# Patient Record
Sex: FEMALE
[De-identification: names, ages, dates, MRNs, and addresses within clinical notes are randomized per-mention and may not be internally consistent; named-entity substitution may affect disease eponyms.]

---

## 2023-06-19 PROBLEM — Z00.00 ENCOUNTER FOR PREVENTIVE HEALTH EXAMINATION: Status: ACTIVE | Noted: 2023-06-19

## 2023-06-20 ENCOUNTER — APPOINTMENT (OUTPATIENT)
Dept: ORTHOPEDIC SURGERY | Facility: CLINIC | Age: 63
End: 2023-06-20
Payer: COMMERCIAL

## 2023-06-20 VITALS — WEIGHT: 133 LBS | HEIGHT: 59 IN | BODY MASS INDEX: 26.81 KG/M2

## 2023-06-20 DIAGNOSIS — M16.11 UNILATERAL PRIMARY OSTEOARTHRITIS, RIGHT HIP: ICD-10-CM

## 2023-06-20 DIAGNOSIS — M54.50 LOW BACK PAIN, UNSPECIFIED: ICD-10-CM

## 2023-06-20 DIAGNOSIS — E78.00 PURE HYPERCHOLESTEROLEMIA, UNSPECIFIED: ICD-10-CM

## 2023-06-20 PROCEDURE — 72100 X-RAY EXAM L-S SPINE 2/3 VWS: CPT

## 2023-06-20 PROCEDURE — 73564 X-RAY EXAM KNEE 4 OR MORE: CPT | Mod: 50

## 2023-06-20 PROCEDURE — 99205 OFFICE O/P NEW HI 60 MIN: CPT

## 2023-06-20 PROCEDURE — 72170 X-RAY EXAM OF PELVIS: CPT

## 2023-06-20 RX ORDER — HYALURONATE SODIUM 30 MG/2 ML
30 SYRINGE (ML) INTRAARTICULAR
Qty: 4 | Refills: 0 | Status: ACTIVE | COMMUNITY
Start: 2023-06-20 | End: 1900-01-01

## 2023-06-20 NOTE — IMAGING
[de-identified] : Constitutional: well developed and well nourished, able to communicate\par Cardiovascular: Peripheral vascular exam is grossly normal\par Neurologic: Alert and oriented, no acute distress.\par Skin: normal skin with no ulcers, rashes, or lesions\par Pulmonary: No respiratory distress, breathing comfortably on room air\par Lymphatics: No obvious lymphadenopathy or lymphedema in areas examined\par \par Left KNEE EXAM\par Alignment: neutral\par Effusion: None\par Atrophy: None                                                 \par Stable to Varus/valgus stress\par Posterior Drawer Test: negative\par Anterior Drawer Test: Negative\par Knee Extension/Flexion: 0 / 120\par \par Medial/lateral compartments\par Medial joint line: POS Tenderness\par Lateral joint line: POS Tenderness\par Henrique test: negative\par \par Patellofemoral joint\par Medial patellar facet: no tenderness\par Patellar grind: Negative\par \par Tendons:\par Pes Anserine: No tenderness\par Gerdy’s Tubercle/ IT Band: No tenderness\par Quadriceps Tendon: No Tenderness\par patellar tendon: no Tenderness\par Tibial tubercle: not tenderness\par Calf: no Tenderness\par \par Neurovascular exam\par Muscle strength: 5/5\par Sensation to light touch: intact\par Distal pulses: 2+\par \par bilalateral hips \par hips WNL without impingement\par \par IMAGING:\par 06/20/2023 Xrays of the bilateral Knee were taken demonstrating tricompartmental arthritis with joint space collapse, osteophytes, and sclerosis. Lumbar with L5/S1 DDD and right hip tonnise grade 2 OA

## 2023-06-20 NOTE — HISTORY OF PRESENT ILLNESS
[10] : 10 [Dull/Aching] : dull/aching [Sharp] : sharp [Constant] : constant [Walking] : walking [Stairs] : stairs [de-identified] : 06/20/2023 Ms. RICHARDSON MAN is a 62 year female that comes in today with a chief complaint of Neil knee/ right knee Pt had a right knee a tear meniscus was done in 2018  Left knee > right knee. pt is concerned about numbing on her left foot pt sates she also have pain on her right hip radiating down her lower back \par \par Last injection Left knee March 2023. [] : no [FreeTextEntry1] : Neil. knees [FreeTextEntry6] : pinching, numbness  [FreeTextEntry7] : left knee to thigh and down to her left foot  [de-identified] : 2018

## 2023-06-20 NOTE — ASSESSMENT
[FreeTextEntry1] : 62 year F with bilateral knee OA with R>L radiographically and left side more symptomatic\par left knee orthovisc submission for approval\par fu after approvaed\par

## 2023-06-21 RX ORDER — SODIUM HYALURONATE INTRA-ARTICULAR SOLN PREF SYR 25 MG/2.5ML 25/2.5 MG/ML
25 SOLUTION PREFILLED SYRINGE INTRAARTICULAR
Qty: 10 | Refills: 0 | Status: ACTIVE | COMMUNITY
Start: 2023-06-21

## 2023-07-18 ENCOUNTER — APPOINTMENT (OUTPATIENT)
Dept: ORTHOPEDIC SURGERY | Facility: CLINIC | Age: 63
End: 2023-07-18
Payer: COMMERCIAL

## 2023-07-18 VITALS — HEIGHT: 59 IN | WEIGHT: 133 LBS | BODY MASS INDEX: 26.81 KG/M2

## 2023-07-18 PROCEDURE — 20610 DRAIN/INJ JOINT/BURSA W/O US: CPT | Mod: LT

## 2023-07-18 PROCEDURE — 99213 OFFICE O/P EST LOW 20 MIN: CPT | Mod: 25

## 2023-07-18 NOTE — HISTORY OF PRESENT ILLNESS
[10] : 10 [Dull/Aching] : dull/aching [Sharp] : sharp [Constant] : constant [Walking] : walking [Stairs] : stairs [1] : 1 [Supartz] : Supartz [de-identified] : 06/20/2023 Ms. RICHARDSON MAN is a 62 year female that comes in today with a chief complaint of Neil knee/ right knee Pt had a right knee a tear meniscus was done in 2018  Left knee > right knee. pt is concerned about numbing on her left foot pt sates she also have pain on her right hip radiating down her lower back \par \par Last injection Left knee March 2023.\par \par 07/18/2023 follow up visit injection #1 left knee supartz [] : no [FreeTextEntry1] : Neil. knees [FreeTextEntry6] : pinching, numbness  [FreeTextEntry7] : left knee to thigh and down to her left foot  [de-identified] : 2018 [de-identified] : left knee

## 2023-07-18 NOTE — IMAGING
[de-identified] : Constitutional: well developed and well nourished, able to communicate\par Cardiovascular: Peripheral vascular exam is grossly normal\par Neurologic: Alert and oriented, no acute distress.\par Skin: normal skin with no ulcers, rashes, or lesions\par Pulmonary: No respiratory distress, breathing comfortably on room air\par Lymphatics: No obvious lymphadenopathy or lymphedema in areas examined\par \par Left KNEE EXAM\par Alignment: neutral\par Effusion: None\par Atrophy: None                                                 \par Stable to Varus/valgus stress\par Posterior Drawer Test: negative\par Anterior Drawer Test: Negative\par Knee Extension/Flexion: 0 / 120\par \par Medial/lateral compartments\par Medial joint line: POS Tenderness\par Lateral joint line: POS Tenderness\par Henrique test: negative\par \par Patellofemoral joint\par Medial patellar facet: no tenderness\par Patellar grind: Negative\par \par Tendons:\par Pes Anserine: No tenderness\par Gerdy’s Tubercle/ IT Band: No tenderness\par Quadriceps Tendon: No Tenderness\par patellar tendon: no Tenderness\par Tibial tubercle: not tenderness\par Calf: no Tenderness\par \par Neurovascular exam\par Muscle strength: 5/5\par Sensation to light touch: intact\par Distal pulses: 2+\par \par bilalateral hips \par hips WNL without impingement\par \par IMAGING:\par 06/20/2023 Xrays of the bilateral Knee were taken demonstrating tricompartmental arthritis with joint space collapse, osteophytes, and sclerosis. Lumbar with L5/S1 DDD and right hip tonnise grade 2 OA

## 2023-07-18 NOTE — ASSESSMENT
[FreeTextEntry1] : 62 year F with bilateral knee OA with R>L radiographically and left side more symptomatic\par left knee orthovisc submission for approval\par fu after approved\par \par 7/18/23: L knee OA\par Supartz 1 of 5, follow up in one week for inj #2\par

## 2023-07-18 NOTE — PROCEDURE
[FreeTextEntry3] : Large joint Viscosupplementation Injection: L Knee\par \par Patient indicated for injection after trial of rest, OTC medications including aspirin, Ibuprofen, Aleve etc or prescription NSAIDS, and/or exercises at home and/ or physical therapy without satisfactory response.  Patient has symptoms including pain, swelling, and/or decreased mobility in the joint. The risks, benefits, and alternatives to injection were explained in full to the patient, including but not limited to infection, sepsis, bleeding, scarring, skin discoloration, temporary increase in pain, syncopal episode, failure to resolve symptoms, allergic reaction, symptom recurrence, and elevation of blood sugar in diabetics. Patient understood the risks. All questions were answered. After discussion of options, patient requested an injection. \par \par Oral informed consent was obtained and sterile technique was utilized for the procedure including the preparation of the solutions used for the injection followed by alcohol prep to the injection site. Anesthesia was given with ethyl chloride sprayed topically. The injection was delivered. Patient tolerated the procedure well. \par \par Post Procedure Instructions: Patient was advised to call if redness, pain, or fever occur and apply ice for 15 min on and 15 min off later today\par \par Medications delivered:Supartz # 1\par

## 2023-07-25 ENCOUNTER — APPOINTMENT (OUTPATIENT)
Dept: ORTHOPEDIC SURGERY | Facility: CLINIC | Age: 63
End: 2023-07-25
Payer: COMMERCIAL

## 2023-07-25 VITALS — HEIGHT: 59 IN | WEIGHT: 133 LBS | BODY MASS INDEX: 26.81 KG/M2

## 2023-07-25 PROCEDURE — 99213 OFFICE O/P EST LOW 20 MIN: CPT | Mod: 25

## 2023-07-25 PROCEDURE — 20610 DRAIN/INJ JOINT/BURSA W/O US: CPT | Mod: LT

## 2023-07-25 RX ORDER — MELOXICAM 15 MG/1
15 TABLET ORAL
Qty: 30 | Refills: 0 | Status: ACTIVE | COMMUNITY
Start: 2023-06-20 | End: 1900-01-01

## 2023-07-25 NOTE — PROCEDURE
[FreeTextEntry3] : Large joint Viscosupplementation Injection: L Knee\par \par Patient indicated for injection after trial of rest, OTC medications including aspirin, Ibuprofen, Aleve etc or prescription NSAIDS, and/or exercises at home and/ or physical therapy without satisfactory response.  Patient has symptoms including pain, swelling, and/or decreased mobility in the joint. The risks, benefits, and alternatives to injection were explained in full to the patient, including but not limited to infection, sepsis, bleeding, scarring, skin discoloration, temporary increase in pain, syncopal episode, failure to resolve symptoms, allergic reaction, symptom recurrence, and elevation of blood sugar in diabetics. Patient understood the risks. All questions were answered. After discussion of options, patient requested an injection. \par \par Oral informed consent was obtained and sterile technique was utilized for the procedure including the preparation of the solutions used for the injection followed by alcohol prep to the injection site. Anesthesia was given with ethyl chloride sprayed topically. The injection was delivered. Patient tolerated the procedure well. \par \par Post Procedure Instructions: Patient was advised to call if redness, pain, or fever occur and apply ice for 15 min on and 15 min off later today\par \par Medications delivered:Supartz # 2\par

## 2023-07-25 NOTE — HISTORY OF PRESENT ILLNESS
[10] : 10 [Dull/Aching] : dull/aching [Sharp] : sharp [Constant] : constant [Walking] : walking [Stairs] : stairs [Supartz] : Supartz [2] : 2 [de-identified] : 06/20/2023 Ms. RICHARDSON MAN is a 62 year female that comes in today with a chief complaint of Neil knee/ right knee Pt had a right knee a tear meniscus was done in 2018  Left knee > right knee. pt is concerned about numbing on her left foot pt sates she also have pain on her right hip radiating down her lower back \par \par Last injection Left knee March 2023.\par \par 07/18/2023 follow up visit injection #1 left knee supartz\par \par 07/25/2023 follow up injection left knee#2 [] : no [FreeTextEntry1] : Neil. knees [FreeTextEntry6] : pinching, numbness  [FreeTextEntry7] : left knee to thigh and down to her left foot  [de-identified] : 2018 [de-identified] : left knee

## 2023-07-25 NOTE — IMAGING
[de-identified] : Constitutional: well developed and well nourished, able to communicate\par Cardiovascular: Peripheral vascular exam is grossly normal\par Neurologic: Alert and oriented, no acute distress.\par Skin: normal skin with no ulcers, rashes, or lesions\par Pulmonary: No respiratory distress, breathing comfortably on room air\par Lymphatics: No obvious lymphadenopathy or lymphedema in areas examined\par \par Left KNEE EXAM\par Alignment: neutral\par Effusion: None\par Atrophy: None                                                 \par Stable to Varus/valgus stress\par Posterior Drawer Test: negative\par Anterior Drawer Test: Negative\par Knee Extension/Flexion: 0 / 120\par \par Medial/lateral compartments\par Medial joint line: POS Tenderness\par Lateral joint line: POS Tenderness\par Henrique test: negative\par \par Patellofemoral joint\par Medial patellar facet: no tenderness\par Patellar grind: Negative\par \par Tendons:\par Pes Anserine: No tenderness\par Gerdy’s Tubercle/ IT Band: No tenderness\par Quadriceps Tendon: No Tenderness\par patellar tendon: no Tenderness\par Tibial tubercle: not tenderness\par Calf: no Tenderness\par \par Neurovascular exam\par Muscle strength: 5/5\par Sensation to light touch: intact\par Distal pulses: 2+\par \par bilalateral hips \par hips WNL without impingement\par \par IMAGING:\par 06/20/2023 Xrays of the bilateral Knee were taken demonstrating tricompartmental arthritis with joint space collapse, osteophytes, and sclerosis. Lumbar with L5/S1 DDD and right hip tonnise grade 2 OA

## 2023-07-25 NOTE — ASSESSMENT
[FreeTextEntry1] : 62 year F with bilateral knee OA with R>L radiographically and left side more symptomatic\par left knee orthovisc submission for approval\par fu after approved\par \par 7/18/23: L knee OA\par Supartz 1 of 5, follow up in one week for inj #2\par \par 07/25/2023 \par Supartz 2 of 5, follow up in one week for inj #3\par

## 2023-08-01 ENCOUNTER — APPOINTMENT (OUTPATIENT)
Dept: ORTHOPEDIC SURGERY | Facility: CLINIC | Age: 63
End: 2023-08-01
Payer: COMMERCIAL

## 2023-08-01 VITALS — HEIGHT: 59 IN | WEIGHT: 133 LBS | BODY MASS INDEX: 26.81 KG/M2

## 2023-08-01 DIAGNOSIS — M17.11 UNILATERAL PRIMARY OSTEOARTHRITIS, RIGHT KNEE: ICD-10-CM

## 2023-08-01 DIAGNOSIS — M25.561 PAIN IN RIGHT KNEE: ICD-10-CM

## 2023-08-01 PROCEDURE — 20610 DRAIN/INJ JOINT/BURSA W/O US: CPT | Mod: LT

## 2023-08-01 PROCEDURE — 99213 OFFICE O/P EST LOW 20 MIN: CPT | Mod: 25

## 2023-08-01 NOTE — ASSESSMENT
[FreeTextEntry1] : 62 year F with bilateral knee OA with R>L radiographically and left side more symptomatic left knee orthovisc submission for approval fu after approved  7/18/23: L knee OA Supartz 1 of 5, follow up in one week for inj #2  07/25/2023  Supartz 2 of 5, follow up in one week for inj #3  8/1/23: Supartz 3 of 5, follow up in one week for inj #4

## 2023-08-01 NOTE — HISTORY OF PRESENT ILLNESS
[10] : 10 [Dull/Aching] : dull/aching [Sharp] : sharp [Constant] : constant [Walking] : walking [Stairs] : stairs [3] : 3 [Supartz] : Supartz [de-identified] : 06/20/2023 Ms. RICHARDSON MAN is a 62 year female that comes in today with a chief complaint of Neil knee/ right knee Pt had a right knee a tear meniscus was done in 2018  Left knee > right knee. pt is concerned about numbing on her left foot pt sates she also have pain on her right hip radiating down her lower back   Last injection Left knee March 2023.  07/18/2023 follow up visit injection #1 left knee supartz  07/25/2023 follow up injection left knee#2  08/01/2023 follow up visit injection left knee  #3 [] : no [FreeTextEntry1] : Neil. knees [FreeTextEntry6] : pinching, numbness  [FreeTextEntry7] : left knee to thigh and down to her left foot  [de-identified] : 2018 [de-identified] : left knee

## 2023-08-01 NOTE — PROCEDURE
[FreeTextEntry3] : Large joint Viscosupplementation Injection: L Knee  Patient indicated for injection after trial of rest, OTC medications including aspirin, Ibuprofen, Aleve etc or prescription NSAIDS, and/or exercises at home and/ or physical therapy without satisfactory response.  Patient has symptoms including pain, swelling, and/or decreased mobility in the joint. The risks, benefits, and alternatives to injection were explained in full to the patient, including but not limited to infection, sepsis, bleeding, scarring, skin discoloration, temporary increase in pain, syncopal episode, failure to resolve symptoms, allergic reaction, symptom recurrence, and elevation of blood sugar in diabetics. Patient understood the risks. All questions were answered. After discussion of options, patient requested an injection.   Oral informed consent was obtained and sterile technique was utilized for the procedure including the preparation of the solutions used for the injection followed by alcohol prep to the injection site. Anesthesia was given with ethyl chloride sprayed topically. The injection was delivered. Patient tolerated the procedure well.   Post Procedure Instructions: Patient was advised to call if redness, pain, or fever occur and apply ice for 15 min on and 15 min off later today  Medications delivered:Supartz # 3

## 2023-08-01 NOTE — IMAGING
[de-identified] : Constitutional: well developed and well nourished, able to communicate\par  Cardiovascular: Peripheral vascular exam is grossly normal\par  Neurologic: Alert and oriented, no acute distress.\par  Skin: normal skin with no ulcers, rashes, or lesions\par  Pulmonary: No respiratory distress, breathing comfortably on room air\par  Lymphatics: No obvious lymphadenopathy or lymphedema in areas examined\par  \par  Left KNEE EXAM\par  Alignment: neutral\par  Effusion: None\par  Atrophy: None                                                 \par  Stable to Varus/valgus stress\par  Posterior Drawer Test: negative\par  Anterior Drawer Test: Negative\par  Knee Extension/Flexion: 0 / 120\par  \par  Medial/lateral compartments\par  Medial joint line: POS Tenderness\par  Lateral joint line: POS Tenderness\par  Henrique test: negative\par  \par  Patellofemoral joint\par  Medial patellar facet: no tenderness\par  Patellar grind: Negative\par  \par  Tendons:\par  Pes Anserine: No tenderness\par  Gerdy's Tubercle/ IT Band: No tenderness\par  Quadriceps Tendon: No Tenderness\par  patellar tendon: no Tenderness\par  Tibial tubercle: not tenderness\par  Calf: no Tenderness\par  \par  Neurovascular exam\par  Muscle strength: 5/5\par  Sensation to light touch: intact\par  Distal pulses: 2+\par  \par  bilalateral hips \par  hips WNL without impingement\par  \par  IMAGING:\par  06/20/2023 Xrays of the bilateral Knee were taken demonstrating tricompartmental arthritis with joint space collapse, osteophytes, and sclerosis. Lumbar with L5/S1 DDD and right hip tonnise grade 2 OA

## 2023-08-08 ENCOUNTER — APPOINTMENT (OUTPATIENT)
Dept: ORTHOPEDIC SURGERY | Facility: CLINIC | Age: 63
End: 2023-08-08
Payer: COMMERCIAL

## 2023-08-08 VITALS — HEIGHT: 59 IN | BODY MASS INDEX: 26.81 KG/M2 | WEIGHT: 133 LBS

## 2023-08-08 DIAGNOSIS — M25.562 PAIN IN LEFT KNEE: ICD-10-CM

## 2023-08-08 PROCEDURE — 20610 DRAIN/INJ JOINT/BURSA W/O US: CPT | Mod: LT

## 2023-08-08 PROCEDURE — 99213 OFFICE O/P EST LOW 20 MIN: CPT | Mod: 25

## 2023-08-08 NOTE — ASSESSMENT
[FreeTextEntry1] : 62 year F with bilateral knee OA with R>L radiographically and left side more symptomatic left knee orthovisc submission for approval fu after approved  7/18/23: L knee OA Supartz 1 of 5, follow up in one week for inj #2  07/25/2023  Supartz 2 of 5, follow up in one week for inj #3  8/1/23: Supartz 3 of 5, follow up in one week for inj #4  8/8/23: Supartz 4 of 5, follow up in one week for inj #5

## 2023-08-08 NOTE — IMAGING
[de-identified] : Constitutional: well developed and well nourished, able to communicate\par  Cardiovascular: Peripheral vascular exam is grossly normal\par  Neurologic: Alert and oriented, no acute distress.\par  Skin: normal skin with no ulcers, rashes, or lesions\par  Pulmonary: No respiratory distress, breathing comfortably on room air\par  Lymphatics: No obvious lymphadenopathy or lymphedema in areas examined\par  \par  Left KNEE EXAM\par  Alignment: neutral\par  Effusion: None\par  Atrophy: None                                                 \par  Stable to Varus/valgus stress\par  Posterior Drawer Test: negative\par  Anterior Drawer Test: Negative\par  Knee Extension/Flexion: 0 / 120\par  \par  Medial/lateral compartments\par  Medial joint line: POS Tenderness\par  Lateral joint line: POS Tenderness\par  Henrique test: negative\par  \par  Patellofemoral joint\par  Medial patellar facet: no tenderness\par  Patellar grind: Negative\par  \par  Tendons:\par  Pes Anserine: No tenderness\par  Gerdy's Tubercle/ IT Band: No tenderness\par  Quadriceps Tendon: No Tenderness\par  patellar tendon: no Tenderness\par  Tibial tubercle: not tenderness\par  Calf: no Tenderness\par  \par  Neurovascular exam\par  Muscle strength: 5/5\par  Sensation to light touch: intact\par  Distal pulses: 2+\par  \par  bilalateral hips \par  hips WNL without impingement\par  \par  IMAGING:\par  06/20/2023 Xrays of the bilateral Knee were taken demonstrating tricompartmental arthritis with joint space collapse, osteophytes, and sclerosis. Lumbar with L5/S1 DDD and right hip tonnise grade 2 OA

## 2023-08-08 NOTE — PROCEDURE
[FreeTextEntry3] : Large joint Viscosupplementation Injection: L Knee  Patient indicated for injection after trial of rest, OTC medications including aspirin, Ibuprofen, Aleve etc or prescription NSAIDS, and/or exercises at home and/ or physical therapy without satisfactory response.  Patient has symptoms including pain, swelling, and/or decreased mobility in the joint. The risks, benefits, and alternatives to injection were explained in full to the patient, including but not limited to infection, sepsis, bleeding, scarring, skin discoloration, temporary increase in pain, syncopal episode, failure to resolve symptoms, allergic reaction, symptom recurrence, and elevation of blood sugar in diabetics. Patient understood the risks. All questions were answered. After discussion of options, patient requested an injection.   Oral informed consent was obtained and sterile technique was utilized for the procedure including the preparation of the solutions used for the injection followed by alcohol prep to the injection site. Anesthesia was given with ethyl chloride sprayed topically. The injection was delivered. Patient tolerated the procedure well.   Post Procedure Instructions: Patient was advised to call if redness, pain, or fever occur and apply ice for 15 min on and 15 min off later today  Medications delivered:Supartz # 4

## 2023-08-08 NOTE — HISTORY OF PRESENT ILLNESS
[10] : 10 [Dull/Aching] : dull/aching [Sharp] : sharp [Constant] : constant [Walking] : walking [Stairs] : stairs [Supartz] : Supartz [3] : 3 [de-identified] : 06/20/2023 Ms. RICHARDSON MAN is a 62 year female that comes in today with a chief complaint of Neil knee/ right knee Pt had a right knee a tear meniscus was done in 2018  Left knee > right knee. pt is concerned about numbing on her left foot pt sates she also have pain on her right hip radiating down her lower back   Last injection Left knee March 2023.  07/18/2023 follow up visit injection #1 left knee supartz  07/25/2023 follow up injection left knee#2  08/01/2023 follow up visit injection left knee  #3  8/8/23 Follow up visit injection #4 [] : Post Surgical Visit: no [FreeTextEntry1] : Neil. knees [FreeTextEntry6] : pinching, numbness  [FreeTextEntry7] : left knee to thigh and down to her left foot  [de-identified] : 2018 [de-identified] : left knee

## 2023-08-15 ENCOUNTER — TRANSCRIPTION ENCOUNTER (OUTPATIENT)
Age: 63
End: 2023-08-15

## 2023-08-15 ENCOUNTER — APPOINTMENT (OUTPATIENT)
Dept: ORTHOPEDIC SURGERY | Facility: CLINIC | Age: 63
End: 2023-08-15
Payer: COMMERCIAL

## 2023-08-15 VITALS — WEIGHT: 133 LBS | BODY MASS INDEX: 26.81 KG/M2 | HEIGHT: 59 IN

## 2023-08-15 DIAGNOSIS — M17.12 UNILATERAL PRIMARY OSTEOARTHRITIS, LEFT KNEE: ICD-10-CM

## 2023-08-15 PROCEDURE — 99213 OFFICE O/P EST LOW 20 MIN: CPT | Mod: 25

## 2023-08-15 PROCEDURE — 20610 DRAIN/INJ JOINT/BURSA W/O US: CPT | Mod: LT

## 2023-08-15 NOTE — HISTORY OF PRESENT ILLNESS
[10] : 10 [Dull/Aching] : dull/aching [Sharp] : sharp [Constant] : constant [Walking] : walking [Stairs] : stairs [3] : 3 [Supartz] : Supartz [de-identified] : 06/20/2023 Ms. RICHARDSON MAN is a 62 year female that comes in today with a chief complaint of Neil knee/ right knee Pt had a right knee a tear meniscus was done in 2018  Left knee > right knee. pt is concerned about numbing on her left foot pt sates she also have pain on her right hip radiating down her lower back   Last injection Left knee March 2023.  07/18/2023 follow up visit injection #1 left knee supartz  07/25/2023 follow up injection left knee#2  08/01/2023 follow up visit injection left knee  #3  8/8/23 Follow up visit injection #4 08/15/2023 follow up visit.  [] : Post Surgical Visit: no [FreeTextEntry1] : Neil. knees [FreeTextEntry6] : pinching, numbness  [FreeTextEntry7] : left knee to thigh and down to her left foot  [de-identified] : 2018 [de-identified] : left knee

## 2023-08-15 NOTE — IMAGING
[de-identified] : Constitutional: well developed and well nourished, able to communicate Cardiovascular: Peripheral vascular exam is grossly normal Neurologic: Alert and oriented, no acute distress. Skin: normal skin with no ulcers, rashes, or lesions Pulmonary: No respiratory distress, breathing comfortably on room air Lymphatics: No obvious lymphadenopathy or lymphedema in areas examined  Left KNEE EXAM Alignment: neutral Effusion: None Atrophy: None                                                  Stable to Varus/valgus stress Posterior Drawer Test: negative Anterior Drawer Test: Negative Knee Extension/Flexion: 0 / 120  Medial/lateral compartments Medial joint line: POS Tenderness Lateral joint line: POS Tenderness Henrique test: negative  Patellofemoral joint Medial patellar facet: no tenderness Patellar grind: Negative  Tendons: Pes Anserine: No tenderness Gerdy's Tubercle/ IT Band: No tenderness Quadriceps Tendon: No Tenderness patellar tendon: no Tenderness Tibial tubercle: not tenderness Calf: no Tenderness  Neurovascular exam Muscle strength: 5/5 Sensation to light touch: intact Distal pulses: 2+  bilalateral hips  hips WNL without impingement  [id number] Xrays of the bilateral Knee were taken demonstrating tricompartmental arthritis with joint space collapse, osteophytes, and sclerosis. Lumbar with L5/S1 DDD and right hip tonnise grade 2 OA

## 2023-08-15 NOTE — PROCEDURE
[FreeTextEntry3] : Large joint Viscosupplementation Injection: L Knee  Patient indicated for injection after trial of rest, OTC medications including aspirin, Ibuprofen, Aleve etc or prescription NSAIDS, and/or exercises at home and/ or physical therapy without satisfactory response.  Patient has symptoms including pain, swelling, and/or decreased mobility in the joint. The risks, benefits, and alternatives to injection were explained in full to the patient, including but not limited to infection, sepsis, bleeding, scarring, skin discoloration, temporary increase in pain, syncopal episode, failure to resolve symptoms, allergic reaction, symptom recurrence, and elevation of blood sugar in diabetics. Patient understood the risks. All questions were answered. After discussion of options, patient requested an injection.   Oral informed consent was obtained and sterile technique was utilized for the procedure including the preparation of the solutions used for the injection followed by alcohol prep to the injection site. Anesthesia was given with ethyl chloride sprayed topically. The injection was delivered. Patient tolerated the procedure well.   Post Procedure Instructions: Patient was advised to call if redness, pain, or fever occur and apply ice for 15 min on and 15 min off later today  Medications delivered:Supartz # 5

## 2023-08-15 NOTE — ASSESSMENT
[FreeTextEntry1] : 62 year F with bilateral knee OA with R>L radiographically and left side more symptomatic left knee orthovisc submission for approval fu after approved  7/18/23: L knee OA Supartz 1 of 5, follow up in one week for inj #2  07/25/2023  Supartz 2 of 5, follow up in one week for inj #3  8/1/23: Supartz 3 of 5, follow up in one week for inj #4  8/8/23: Supartz 4 of 5, follow up in one week for inj #5 08/15/2023 supartz 5 of 5 today

## 2025-05-30 ENCOUNTER — INPATIENT (INPATIENT)
Facility: HOSPITAL | Age: 65
LOS: 2 days | Discharge: HOME | DRG: 331 | End: 2025-06-02
Attending: SURGERY | Admitting: SURGERY
Payer: MEDICARE

## 2025-05-30 VITALS
WEIGHT: 136.91 LBS | DIASTOLIC BLOOD PRESSURE: 81 MMHG | HEART RATE: 73 BPM | TEMPERATURE: 98 F | RESPIRATION RATE: 17 BRPM | HEIGHT: 59 IN | OXYGEN SATURATION: 98 % | SYSTOLIC BLOOD PRESSURE: 147 MMHG

## 2025-05-30 LAB
ALBUMIN SERPL ELPH-MCNC: 4.5 G/DL — SIGNIFICANT CHANGE UP (ref 3.3–5)
ALP SERPL-CCNC: 89 U/L — SIGNIFICANT CHANGE UP (ref 40–120)
ALT FLD-CCNC: 54 U/L — HIGH (ref 10–45)
ANION GAP SERPL CALC-SCNC: 13 MMOL/L — SIGNIFICANT CHANGE UP (ref 5–17)
APTT BLD: 38.7 SEC — HIGH (ref 26.1–36.8)
AST SERPL-CCNC: 55 U/L — HIGH (ref 10–40)
BASOPHILS # BLD AUTO: 0.06 K/UL — SIGNIFICANT CHANGE UP (ref 0–0.2)
BASOPHILS NFR BLD AUTO: 0.9 % — SIGNIFICANT CHANGE UP (ref 0–2)
BILIRUB SERPL-MCNC: 0.2 MG/DL — SIGNIFICANT CHANGE UP (ref 0.2–1.2)
BUN SERPL-MCNC: 10 MG/DL — SIGNIFICANT CHANGE UP (ref 7–23)
CALCIUM SERPL-MCNC: 9.4 MG/DL — SIGNIFICANT CHANGE UP (ref 8.4–10.5)
CHLORIDE SERPL-SCNC: 104 MMOL/L — SIGNIFICANT CHANGE UP (ref 96–108)
CO2 SERPL-SCNC: 21 MMOL/L — LOW (ref 22–31)
CREAT SERPL-MCNC: 0.5 MG/DL — SIGNIFICANT CHANGE UP (ref 0.5–1.3)
EGFR: 104 ML/MIN/1.73M2 — SIGNIFICANT CHANGE UP
EGFR: 104 ML/MIN/1.73M2 — SIGNIFICANT CHANGE UP
EOSINOPHIL # BLD AUTO: 0.25 K/UL — SIGNIFICANT CHANGE UP (ref 0–0.5)
EOSINOPHIL NFR BLD AUTO: 3.8 % — SIGNIFICANT CHANGE UP (ref 0–6)
GLUCOSE SERPL-MCNC: 118 MG/DL — HIGH (ref 70–99)
HCT VFR BLD CALC: 44.8 % — SIGNIFICANT CHANGE UP (ref 34.5–45)
HGB BLD-MCNC: 15.6 G/DL — HIGH (ref 11.5–15.5)
IMM GRANULOCYTES NFR BLD AUTO: 0.2 % — SIGNIFICANT CHANGE UP (ref 0–0.9)
INR BLD: 1 — SIGNIFICANT CHANGE UP (ref 0.85–1.16)
LIDOCAIN IGE QN: 37 U/L — SIGNIFICANT CHANGE UP (ref 7–60)
LYMPHOCYTES # BLD AUTO: 2.33 K/UL — SIGNIFICANT CHANGE UP (ref 1–3.3)
LYMPHOCYTES # BLD AUTO: 35.5 % — SIGNIFICANT CHANGE UP (ref 13–44)
MCHC RBC-ENTMCNC: 31.3 PG — SIGNIFICANT CHANGE UP (ref 27–34)
MCHC RBC-ENTMCNC: 34.8 G/DL — SIGNIFICANT CHANGE UP (ref 32–36)
MCV RBC AUTO: 90 FL — SIGNIFICANT CHANGE UP (ref 80–100)
MONOCYTES # BLD AUTO: 0.74 K/UL — SIGNIFICANT CHANGE UP (ref 0–0.9)
MONOCYTES NFR BLD AUTO: 11.3 % — SIGNIFICANT CHANGE UP (ref 2–14)
NEUTROPHILS # BLD AUTO: 3.17 K/UL — SIGNIFICANT CHANGE UP (ref 1.8–7.4)
NEUTROPHILS NFR BLD AUTO: 48.3 % — SIGNIFICANT CHANGE UP (ref 43–77)
NRBC BLD AUTO-RTO: 0 /100 WBCS — SIGNIFICANT CHANGE UP (ref 0–0)
PLATELET # BLD AUTO: 265 K/UL — SIGNIFICANT CHANGE UP (ref 150–400)
POTASSIUM SERPL-MCNC: 4 MMOL/L — SIGNIFICANT CHANGE UP (ref 3.5–5.3)
POTASSIUM SERPL-SCNC: 4 MMOL/L — SIGNIFICANT CHANGE UP (ref 3.5–5.3)
PROT SERPL-MCNC: 7.9 G/DL — SIGNIFICANT CHANGE UP (ref 6–8.3)
PROTHROM AB SERPL-ACNC: 11.7 SEC — SIGNIFICANT CHANGE UP (ref 9.9–13.4)
RBC # BLD: 4.98 M/UL — SIGNIFICANT CHANGE UP (ref 3.8–5.2)
RBC # FLD: 12.9 % — SIGNIFICANT CHANGE UP (ref 10.3–14.5)
RH IG SCN BLD-IMP: POSITIVE — SIGNIFICANT CHANGE UP
SODIUM SERPL-SCNC: 138 MMOL/L — SIGNIFICANT CHANGE UP (ref 135–145)
WBC # BLD: 6.56 K/UL — SIGNIFICANT CHANGE UP (ref 3.8–10.5)
WBC # FLD AUTO: 6.56 K/UL — SIGNIFICANT CHANGE UP (ref 3.8–10.5)

## 2025-05-30 PROCEDURE — 99285 EMERGENCY DEPT VISIT HI MDM: CPT

## 2025-05-30 RX ORDER — ONDANSETRON HCL/PF 4 MG/2 ML
4 VIAL (ML) INJECTION EVERY 6 HOURS
Refills: 0 | Status: DISCONTINUED | OUTPATIENT
Start: 2025-05-30 | End: 2025-06-01

## 2025-05-30 RX ORDER — SODIUM CHLORIDE 9 G/1000ML
1000 INJECTION, SOLUTION INTRAVENOUS
Refills: 0 | Status: DISCONTINUED | OUTPATIENT
Start: 2025-05-30 | End: 2025-06-01

## 2025-05-30 RX ORDER — ACETAMINOPHEN 500 MG/5ML
650 LIQUID (ML) ORAL EVERY 6 HOURS
Refills: 0 | Status: DISCONTINUED | OUTPATIENT
Start: 2025-05-30 | End: 2025-06-01

## 2025-05-30 RX ORDER — PIPERACILLIN-TAZO-DEXTROSE,ISO 3.375G/5
3.38 IV SOLUTION, PIGGYBACK PREMIX FROZEN(ML) INTRAVENOUS ONCE
Refills: 0 | Status: DISCONTINUED | OUTPATIENT
Start: 2025-05-30 | End: 2025-05-31

## 2025-05-30 RX ORDER — ATORVASTATIN CALCIUM 80 MG/1
10 TABLET, FILM COATED ORAL AT BEDTIME
Refills: 0 | Status: DISCONTINUED | OUTPATIENT
Start: 2025-05-30 | End: 2025-06-02

## 2025-05-30 RX ORDER — HYDROMORPHONE/SOD CHLOR,ISO/PF 2 MG/10 ML
0.5 SYRINGE (ML) INJECTION EVERY 4 HOURS
Refills: 0 | Status: DISCONTINUED | OUTPATIENT
Start: 2025-05-30 | End: 2025-06-01

## 2025-05-30 RX ORDER — HEPARIN SODIUM 1000 [USP'U]/ML
5000 INJECTION INTRAVENOUS; SUBCUTANEOUS EVERY 8 HOURS
Refills: 0 | Status: DISCONTINUED | OUTPATIENT
Start: 2025-05-30 | End: 2025-06-01

## 2025-05-30 RX ORDER — GLUCAGON 3 MG/1
1 POWDER NASAL ONCE
Refills: 0 | Status: DISCONTINUED | OUTPATIENT
Start: 2025-05-30 | End: 2025-05-30

## 2025-05-30 RX ORDER — ONDANSETRON HCL/PF 4 MG/2 ML
4 VIAL (ML) INJECTION ONCE
Refills: 0 | Status: DISCONTINUED | OUTPATIENT
Start: 2025-05-30 | End: 2025-05-30

## 2025-05-30 RX ORDER — PIPERACILLIN-TAZO-DEXTROSE,ISO 3.375G/5
3.38 IV SOLUTION, PIGGYBACK PREMIX FROZEN(ML) INTRAVENOUS ONCE
Refills: 0 | Status: COMPLETED | OUTPATIENT
Start: 2025-05-30 | End: 2025-05-30

## 2025-05-30 RX ORDER — HYDROMORPHONE/SOD CHLOR,ISO/PF 2 MG/10 ML
0.25 SYRINGE (ML) INJECTION EVERY 4 HOURS
Refills: 0 | Status: DISCONTINUED | OUTPATIENT
Start: 2025-05-30 | End: 2025-06-01

## 2025-05-30 RX ADMIN — Medication 200 GRAM(S): at 23:17

## 2025-05-30 NOTE — ED PROVIDER NOTE - OBJECTIVE STATEMENT
63 yo F w PMH of HLD p/w RLQ abd pain for 1 month. Pt received outpt CT abd/pelv that showed subacute appendicitis, put on cefpodoxime and flagyl without improvement in pain. She was sent in by her surgeon for admission. No n/v/d/c, no fever.

## 2025-05-30 NOTE — ED ADULT NURSE NOTE - OBJECTIVE STATEMENT
Patient pmhx appendicitis tx w PO abx c/o unresolved abd pain x 1 week. Patient endorses dx to be treated without surgery and was told to seek med eval for worsening symptoms. Patient aaox4, calm, NAD. Breathing even, unlabored, -amu. Abd soft, tender to RLQ. Denies f/c, sob, dizziness, weakness,  complaints.

## 2025-05-30 NOTE — H&P ADULT - ASSESSMENT
65yo Female pt with PMH of HLD, and no significant PSH. Patient presents to [facility] for continued abdominal pain for 1 month, and seen at Dr. Mccoy's office for chronic appendicitis refusing surgery. Patient s/p cirpo/flagyl for 7 days with no improvement in pain. Patient with VSS, Labs showing H/H 15.8/44.8, AST 55, ALT 54, PTT 38.7; and OSH CT A/P showing chronic appendicitis. Patient sent in by Dr. Mccoy for IV ATBx iso chronic appendicitis and patient refusing surgery.     Plan:    IVF  NPO  IV Zosyn  Pain/nausea PRN  SCD/SQH   63yo Female pt with PMH of HLD, and no significant PSH. Patient presents to [facility] for continued abdominal pain for 1 month, and seen at Dr. Mccoy's office for chronic appendicitis refusing surgery. Patient s/p cirpo/flagyl for 7 days with no improvement in pain. Patient with VSS, Labs showing H/H 15.8/44.8, AST 55, ALT 54, PTT 38.7; and OSH CT A/P showing chronic appendicitis. Patient sent in by Dr. Mccoy for IV ATBx iso chronic appendicitis and patient refusing surgery.     Plan:    IVF  NPO  IV Zosyn  Pain/nausea PRN  SCD/SQH      Addendum 6/1/25: 64y female patient with 1 month of RLQ abdominal pain and CT scan showing appendicitis. She was seen by GI  and treated with 7 days of PO abx. She was seen by me 5/30 with persistent RLQ abdominal pain. We discussed appendectomy however she declined surgery and was agreeable to a trial of IV abx. She was admitted and is having persistent symptoms despite over 24 hrs of IV abx . We reviewed the role of an appendectomy to definitively treat her inflammation and she is now agreeable to proceed. Plan for a laparoscopic appendectomy, possibly robotic assisted.

## 2025-05-30 NOTE — ED PROVIDER NOTE - PHYSICAL EXAMINATION
CONSTITUTIONAL: Non-toxic; in no apparent distress  HEAD: Normocephalic; atraumatic  EYES: PERRL; EOM intact   ENMT: External appears normal  NECK: Supple; non-tender  CARD: Normal S1, S2; no murmurs, rubs, or gallops  RESP: Normal chest excursion with respiration; breath sounds clear and equal bilaterally  ABD: Soft, non-distended; + ttp of RLQ abd, no guarding, no rebound, no CVAT   EXT: Normal ROM in all four extremities; non-tender to palpation, no peripheral edema  SKIN: Warm, dry, no rash  NEURO:  No focal neurological deficiencies

## 2025-05-30 NOTE — ED ADULT TRIAGE NOTE - CHIEF COMPLAINT QUOTE
patient reports she was sent to ED by surg team for possible appendicitis, reports she had ct scan done, denies nausea/vomiting, denies fevers.

## 2025-05-30 NOTE — ED ADULT TRIAGE NOTE - ESI TRIAGE ACUITY LEVEL, MLM
[facility]        NAME: Fariba Harry is a 13 y.o. female  : 2011    MRN: [id number]  DATE: 2025  TIME: 4:07 PM    Assessment and Plan   Nausea [R11.0]  1. Nausea  ondansetron (ZOFRAN-ODT) dispersible tablet 4 mg    POCT rapid ANTIGEN strepA            Patient Instructions       Follow up with PCP in 3-5 days.  Proceed to  ER if symptoms worsen.    If tests have been performed at [facility], our office will contact you with results if changes need to be made to the care plan discussed with you at the visit.  You can review your full results on [facility].    Chief Complaint     Chief Complaint   Patient presents with    Cold Like Symptoms     Nausea, sore throat, body aches, cough, chest soreness (from coughing) and starting Monday night. Fever started Tuesday. Pt reports temp of 104 today. OTC meds - Tylenol, Ibuprofen.          History of Present Illness       Sore throat, nausea, body aches . Fevers on and off. Zofran in office. Rapid strep         Review of Systems   Review of Systems   Constitutional:  Positive for fatigue and fever.   HENT:  Positive for sore throat.    Respiratory:  Positive for cough.    Gastrointestinal:  Positive for nausea.   All other systems reviewed and are negative.        Current Medications       Current Outpatient Medications:     Adapalene-Benzoyl Peroxide 0.3-2.5 % GEL, APPLY PEA SIZED AMOUNT TO A CLEAN, DRY FACE AT BEDTIME THEN WASH OFF IN THE MORNING, Disp: , Rfl:     Azelaic Acid 15 % cream, Apply TO affected AREAS of FACE TWICE A DAY, Disp: , Rfl:     clindamycin (CLEOCIN T) 1 % lotion, apply A PEA SIZED AMOUNT to face at bedtime, Disp: , Rfl:     clotrimazole (LOTRIMIN) 1 % cream, Apply topically in the morning To B/L toenails, Disp: 60 g, Rfl: 1    escitalopram (LEXAPRO) 5 mg tablet, Take by mouth, Disp: , Rfl:     famotidine (PEPCID) 20 mg tablet, Take by mouth, Disp: , Rfl:     polyethylene glycol (GLYCOLAX) 17 GM/SCOOP powder, MIX 1  "CAPFUL WITH WATER OR JUICE DAILY. ADJUST DOSE TO KEEP STOOLS SOFT AND DAILY, Disp: , Rfl:     Probiotic Product (PROBIOTIC-10 PO), Take by mouth, Disp: , Rfl:     PEDIATRIC MULTIVITAMINS-FL PO, Take by mouth (Patient not taking: Reported on 3/13/2025), Disp: , Rfl:     tobramycin (TOBREX) 0.3 % SOLN, Administer 1 drop to both eyes every 4 (four) hours while awake (Patient not taking: Reported on 7/6/2024), Disp: 5 mL, Rfl: 0  No current facility-administered medications for this visit.    Current Allergies     Allergies as of 03/13/2025 - Reviewed 03/13/2025   Allergen Reaction Noted    Penicillins Rash 07/22/2020            The following portions of the patient's history were reviewed and updated as appropriate: allergies, current medications, past family history, past medical history, past social history, past surgical history and problem list.     Past Medical History:   Diagnosis Date    Acne     Anxiety        Past Surgical History:   Procedure Laterality Date    NO PAST SURGERIES         Family History   Problem Relation Age of Onset    No Known Problems Mother     No Known Problems Father          Medications have been verified.        Objective   Pulse (!) 124   Temp 98.4 °F (36.9 °C)   Resp 18   Ht 5' 4\" (1.626 m)   Wt 48 kg (105 lb 12.8 oz)   LMP 03/09/2025   SpO2 99%   BMI 18.16 kg/m²   Patient's last menstrual period was 03/09/2025.       Physical Exam     Physical Exam  Vitals reviewed.   Constitutional:       Appearance: Normal appearance.   Cardiovascular:      Rate and Rhythm: Normal rate and regular rhythm.      Pulses: Normal pulses.      Heart sounds: Normal heart sounds.   Pulmonary:      Effort: Pulmonary effort is normal.      Breath sounds: Normal breath sounds.   Musculoskeletal:         General: Normal range of motion.   Lymphadenopathy:      Cervical: Cervical adenopathy present.   Skin:     General: Skin is warm and dry.   Neurological:      Mental Status: She is alert. " 3

## 2025-05-30 NOTE — ED PROVIDER NOTE - CLINICAL SUMMARY MEDICAL DECISION MAKING FREE TEXT BOX
RLQ abd pain since 1 mo ago, outpt CT shows subacute appendicitis.  Lower susp of cholecystitis, urolithiasis, pyelonephritis, colitis, or other intra-abd pathology.  Will obtain: CBC, CMP, lipase  Will provide analgesia while awaiting test results.  Pt is HDS without s/s of sepsis.    ED Course: Surgery admission

## 2025-05-30 NOTE — ED ADULT NURSE NOTE - NSHOSCREENINGQ1_ED_ALL_ED
[facility]    Medicine Progress Note - Hospitalist Service       Date of Admission:  1/16/2021  Date of Service: 01/17/2021    Assessment & Plan     Swathi Dukes is a 82 year old female with a past medical history significant for SLE, mild aortic stenosis, chronic pericardial effusion, who presents to the hospital with concerns for falls and UTI.     Patient was recently hospitalized at [facility] about a week ago with concerns for neck and chest pain.  Underwent coronary angiogram which showed only trivial CAD.  The effusion was felt to be chronic in nature related to her rheumatological disease.  Has a remote history of requiring a pericardial window 13 years ago.  Her pain was felt to be musculoskeletal in etiology and she was discharged home.     Patient reports that she has been doing well since discharge home, when she had onset of fevers and chills a day PTA. She does have a history of having UTIs and presented to the ER that night.  She was found to have a UTI and was sent home with oral antibiotics.  Patient reports that she was sitting in a recliner when she felt like she had to have a bowel movement and she tried to rush to the bathroom.  She fell down and hit the edge of furniture with laceration to her scalp.  She denies loss of consciousness to me and remembers the events.  She had significant bleeding with several soaking towels and was brought by EMS to the ER.  It looks like she required an epi injection to the area and some stitches to stop this scalp bleeding.  Also reports some soreness on her right knee and left foot great toe.  However given that she has had 2 falls in the last 24 hours, observation to the hospital was requested.     #UTI.  Received IV ceftriaxone, lost IV access. Change to PO Ceftin      #Recurrent falls x2.  #Left knee and right great toe bruising.  X-ray negative for fracture.    #Closed head injury with scalp laceration and  hematoma   Patient denies any LOC to me.  Probably worsening weakness due to her UTI.  -Watch on telemetry  -Given her Hx of pericardial effusion, a limited echo was repeated which shows it is stable   -pain control with lidocaine and tylenol   --PT evaluation     #Acute on chronic anemia. With acute anemia due to blood loss from the laceration.   -- Hb in 9-10 range at baseline, decreased to 7.5 this morning, repeat at 8.2. No signs of ongoing bleeding, melena etc.     #Mild asymptomatic bradycardia. Without evidence of sinus pauses, or associated symptoms like dizziness. Her son is worried if that is contributing to her fall.   -- decrease metoprolol to 12.5 mg BID and increase amlodipine to 5 mg BID, watch on tele for any significant pauses or worsening bradycardia      #Chronic pericardial effusion repeat limited echo as above     #Mild aortic stenosis  #SLE.  Continue her Plaquenil  #Hyperlipidemia       Diet: Regular Diet Adult    DVT Prophylaxis: Pneumatic Compression Devices  Momin Catheter: not present  Code Status: No CPR- Do NOT Intubate      Disposition Plan   Expected discharge: Later today vs tomorrow , recommended to prior living arrangement pending PT evaluation, pain control, ambulation.  Entered: Yessy Garcia MD 01/17/2021, 11:47 AM       The patient's care was discussed with the Bedside Nurse and Patient.    Attempted to call whitley Kennedy with pt permission to update, not available, left VM to call back     Update: updated Brent over phone,    Yessy Garcia MD  Hospitalist Service  [facility]    ______________________________________________________________________    Interval History   Chart reviewed and patient seen. Case discussed with nursing staff.     Patient feels ok, just tired, pain is controlled, Denies any chest pain, shortness of breath. No reports of abdominal pain or vomiting. No new complaints voiced otherwise.       Data reviewed today: I reviewed all  medications, new labs and imaging results over the last 24 hours. I personally reviewed    Physical Exam   Vital Signs: Temp: 97.3  F (36.3  C) Temp src: Oral BP: 139/46 Pulse: 64   Resp: 18 SpO2: 95 % O2 Device: None (Room air)    Weight: 138 lbs 6.4 oz    GENERAL:  Awake and alert, Comfortable appearing, No acute distress.  PSYCH: Pleasant, Cooperative, appropriate affect, no hallucinations   EYES: EOMI, conjunctiva clear  HEENT:  Head with significant bruising around the right temporal area with a scalp hematoma, s/p repair in ER. Neck is Supple, trachea is midline   CARDIOVASCULAR: Regular rate and rhythm, Normal S1, S2, no loud murmurs, no rubs or gallops.   PULMONARY:  Clear to auscultation bilaterally with good entry on both sides  CHEST: Good inspiratory effort bilaterally   GI: Abdomen is soft, non tender, non-distended, normal bowel sounds. No rebound or guarding   SKIN:  Dry, warm to touch. No obvious exanthems on exposed areas  EXTREMITIES:  Good capillary refill with signs of adequate peripheral perfusion. No peripheral edema   Neuro: Awake and oriented x 3. No focal weakness or numbness is noted. Moving all extremities with good strength, Grossly non-focal.   MSK: Noted mild bruising on left knee with small effusion and the right great toe, ROM intact.     Data   Recent Labs   Lab 01/17/21  0640 01/16/21  1244 01/16/21  0153   WBC 8.1 11.3* 9.5   HGB 7.5* 8.9* 9.1*   * 101* 99    262 251    141 139   POTASSIUM 3.5 3.9 3.8   CHLORIDE 111* 108 109   CO2 27 27 27   BUN 13 15 19   CR 0.71 0.69 0.82   ANIONGAP 3 6 3   FILIPE 8.5 9.2 8.6   GLC 98 120* 107*   ALBUMIN  --   --  3.3*   PROTTOTAL  --   --  6.4*   BILITOTAL  --   --  0.2   ALKPHOS  --   --  75   ALT  --   --  25   AST  --   --  20   TROPI  --  0.019 <0.015       Recent Results (from the past 24 hour(s))   Head CT w/o contrast    Narrative    CT SCAN OF THE HEAD WITHOUT CONTRAST   1/16/2021 12:01 PM     HISTORY: Fall, head  trauma.    TECHNIQUE:  Axial images of the head and coronal reformations without  IV contrast material. Radiation dose for this scan was reduced using  automated exposure control, adjustment of the mA and/or kV according  to patient size, or iterative reconstruction technique.    COMPARISON: Head CT 8/27/2018.    FINDINGS: There is no evidence of intracranial hemorrhage, mass, acute  infarct or anomaly. The ventricles are prominent in size, but not  significantly changed since prior head CT 8/27/2019. Moderate diffuse  parenchymal volume loss. Mild patchy periventricular white matter  hypodensities which are nonspecific, but likely related to chronic  microvascular ischemic disease.     The visualized portions of the sinuses and mastoids appear normal.    Right lateral scalp soft tissue injury without underlying fracture  appreciated.      Impression    IMPRESSION:     1. No evidence of acute intracranial hemorrhage, mass, or herniation.  2. Moderate diffuse parenchymal volume loss and mild white matter  changes likely due to chronic microvascular ischemic disease.   3. Right lateral scalp soft tissue injury without underlying fracture  appreciated.    ALTA LUQUE MD   Cervical spine CT w/o contrast    Narrative    CT CERVICAL SPINE WITHOUT CONTRAST   1/16/2021 12:02 PM     HISTORY: Fall, head trauma.     TECHNIQUE: Axial images of the cervical spine were obtained without  intravenous contrast. Multiplanar reformations were performed.   Radiation dose for this scan was reduced using automated exposure  control, adjustment of the mA and/or kV according to patient size, or  iterative reconstruction technique.    COMPARISON: None.    FINDINGS: No evidence of fracture. No prevertebral soft tissue  swelling. Alignment is normal. Vertebral body height is maintained. No  destructive osseous lesions. Marked degenerative endplate changes and  loss of disc height throughout the cervical spine. Multiple levels of  facet  hypertrophy. Mild spinal canal narrowings at C3-C4, C4-C5, and  C5-C6 due to disc osteophytes. Multiple levels of neural foraminal  narrowing due to disc osteophytes and facet hypertrophy, particularly  from C3-C4 through C6-C7.     Visualized paraspinous tissues: Unremarkable.      Impression    IMPRESSION:   1. No evidence of acute fracture or subluxation in the cervical spine.  2. Degenerative changes in the cervical spine as described above.     ALTA LUQUE MD   XR Knee Bilateral 3 Views    Narrative    KNEE BILATERAL THREE VIEWS  1/16/2021 12:15 PM     CLINICAL INFORMATION: Fall, bilateral knee pain and bruising.    ADDITIONAL INFORMATION: None.    COMPARISON: None.    FINDINGS:  Postoperative changes of left total knee arthroplasty and  patellar resurfacing. Components appear well seated. Chronic  enthesitis along the superior pole of the patella at the quadriceps  attachment. Tiny left knee joint effusion. No evidence for fracture.  The right knee demonstrates incompletely visualized postoperative  changes within the distal aspect of the right femur. The knee joint  itself is negative for fracture or significant compartment narrowing.  There is a tiny calcification along the medial margin of the medial  tibial plateau which is likely chronic but could potentially represent  capsular or MCL injury. There is no significant intra-articular  effusion, but there is soft tissue swelling external and anterior to  the patella.    LUNA ADAMS MD   XR Toe Right G/E 2 Views    Narrative    RIGHT TOE TWO OR MORE VIEWS 1/16/2021 12:16 PM     CLINICAL INFORMATION: Toe pain after fall.    ADDITIONAL INFORMATION: None.    COMPARISON: None.    FINDINGS:  Degenerative change at the first MTP joint and IP joint of  the great toe. Degenerative change of multiple IP joints of the  additional toes. Degenerative change across the midfoot. No evidence  for fracture. No erosive changes are identified.    LUNA ADAMS MD    Echocardiogram Limited    Narrative    [id number]  [id number]  [id number]  [id number]^TRISTIN^VALERI^UR           [facility]  Echocardiography Laboratory  [address] [place] [address]        Name: RYLEE BRICE  MRN: [id number]  : 1938  Study Date: 2021 07:56 AM  Age: 82 yrs  Gender: Female  Patient Location: [facility]  Reason For Study: Pericardial Effusion  Ordering Physician: VALERI CROW  Referring Physician: MORENO CHAVEZ  Performed By: Barbara Benavidez     BSA: 1.7 m2  Height: 63 in  Weight: 138 lb  HR: 53  BP: 174/59 mmHg  _____________________________________________________________________________  __        Procedure  Limited Portable Echo Adult.  _____________________________________________________________________________  __        Interpretation Summary     Left ventricular systolic function is normal.  The visual ejection fraction is estimated at 60-65%.  The right ventricle is normal in structure, function and size.  Mild-moderate aortic regurgitation. Mild aortic stenosis. Mean gradient 13  mmHg.  Localized moderate pericardial effusion adjacent to the inferolateral  (posterior) wall. Diameter 1.8 cm. Mild septal flattening is present but  insignificant TR and MV Doppler inflow profile variation to suggestive  significant constrictive physiology. No RV or RA diastolic collapse.  IVC is normal.        Compared to study dated 1/3/2021, findings are overall similar.  _____________________________________________________________________________  __        Left Ventricle  The left ventricle is normal in structure, function and size. Left ventricular  systolic function is normal. The visual ejection fraction is estimated at 60-  65%. Normal left ventricular wall motion.     Right Ventricle  The right ventricle is normal in structure, function and size.     Atria  The left atrium is not well visualized. Right atrial size is normal.     Mitral Valve  The  mitral valve is normal in structure and function. There is mild (1+)  mitral regurgitation.        Tricuspid Valve  The tricuspid valve is normal in structure and function. There is mild (1+)  tricuspid regurgitation. Right ventricular systolic pressure could not be  approximated due to inadequate tricuspid regurgitation.     Aortic Valve  The aortic valve is trileaflet. There is mild to moderate (1-2+) aortic  regurgitation. Mild valvular aortic stenosis. The mean AoV pressure gradient  is 13.3 mmHg.     Pulmonic Valve  The pulmonic valve is normal in structure and function. There is mild (1+)  pulmonic valvular regurgitation.     Vessels  The aortic root is normal size. The inferior vena cava is normal.     Pericardium  Localized moderate pericardial effusion adjacent to the inferolateral  (posterior) wall. Diameter 1.8 cm. Mild septal flattening is present but  insignificant TR and MV Doppler inflow profile variation to suggestive  significant constrictive physiology. The mitral valve inflow Doppler reveals  no significant respiratory variation. The tricuspid valve inflow Doppler  reveals no significant respiratory variation. There are no echocardiographic  indications of cardiac tamponade.     _____________________________________________________________________________  __  MMode/2D Measurements & Calculations  LVOT diam: 1.7 cm  LVOT area: 2.2 cm2        Doppler Measurements & Calculations  Ao V2 max: 257.9 cm/sec  Ao max P.0 mmHg  Ao V2 mean: 170.0 cm/sec  Ao mean P.3 mmHg  Ao V2 VTI: 61.7 cm  TRISTIN(I,D): 1.4 cm2  TRISTIN(V,D): 1.4 cm2     AI P1/2t: 654.1 msec  LV V1 max PG: 10.5 mmHg  LV V1 max: 162.4 cm/sec  LV V1 VTI: 40.4 cm  SV(LVOT): 89.3 ml  SI(LVOT): 54.0 ml/m2  AV Kike Ratio (DI): 0.63  TRISTIN Index (cm2/m2): 0.88           _____________________________________________________________________________  __           Report approved by: Erin Vicente 2021 09:10 AM        Medications        amLODIPine  5 mg Oral Daily     cefTRIAXone  1 g Intravenous Q24H     enalapril  20 mg Oral BID     hydrALAZINE  25 mg Oral TID     hydroxychloroquine  200 mg Oral BID     lidocaine  2 patch Transdermal Q24h    And     lidocaine   Transdermal Q8H     metoprolol tartrate  25 mg Oral BID     OLANZapine  10 mg Oral At Bedtime     PARoxetine  25 mg Oral QAM        No

## 2025-05-31 LAB — BLD GP AB SCN SERPL QL: NEGATIVE — SIGNIFICANT CHANGE UP

## 2025-05-31 RX ORDER — PIPERACILLIN-TAZO-DEXTROSE,ISO 3.375G/5
3.38 IV SOLUTION, PIGGYBACK PREMIX FROZEN(ML) INTRAVENOUS EVERY 8 HOURS
Refills: 0 | Status: DISCONTINUED | OUTPATIENT
Start: 2025-05-31 | End: 2025-06-01

## 2025-05-31 RX ADMIN — Medication 20 MILLIGRAM(S): at 06:02

## 2025-05-31 RX ADMIN — Medication 25 GRAM(S): at 18:16

## 2025-05-31 RX ADMIN — Medication 20 MILLIGRAM(S): at 18:17

## 2025-05-31 RX ADMIN — ATORVASTATIN CALCIUM 10 MILLIGRAM(S): 80 TABLET, FILM COATED ORAL at 21:28

## 2025-05-31 RX ADMIN — Medication 25 GRAM(S): at 03:29

## 2025-05-31 RX ADMIN — Medication 25 GRAM(S): at 11:51

## 2025-05-31 RX ADMIN — SODIUM CHLORIDE 100 MILLILITER(S): 9 INJECTION, SOLUTION INTRAVENOUS at 03:30

## 2025-05-31 RX ADMIN — HEPARIN SODIUM 5000 UNIT(S): 1000 INJECTION INTRAVENOUS; SUBCUTANEOUS at 06:02

## 2025-05-31 RX ADMIN — HEPARIN SODIUM 5000 UNIT(S): 1000 INJECTION INTRAVENOUS; SUBCUTANEOUS at 21:28

## 2025-05-31 RX ADMIN — HEPARIN SODIUM 5000 UNIT(S): 1000 INJECTION INTRAVENOUS; SUBCUTANEOUS at 14:26

## 2025-05-31 NOTE — CHART NOTE - NSCHARTNOTEFT_GEN_A_CORE
Serial Abdominal Exam    SUBJECTIVE: Pt seen and examined at bedside. Denies significant pain. Tolerating diet without nausea  or vomiting. Has been passing gas and having bowel movements.         Objective   MEDICATIONS  (STANDING):  atorvastatin 10 milliGRAM(s) Oral at bedtime  famotidine    Tablet 20 milliGRAM(s) Oral every 12 hours  heparin   Injectable 5000 Unit(s) SubCutaneous every 8 hours  lactated ringers. 1000 milliLiter(s) (100 mL/Hr) IV Continuous <Continuous>  piperacillin/tazobactam IVPB.. 3.375 Gram(s) IV Intermittent every 8 hours    MEDICATIONS  (PRN):  acetaminophen     Tablet .. 650 milliGRAM(s) Oral every 6 hours PRN Temp greater or equal to 38C (100.4F), Mild Pain (1 - 3)  HYDROmorphone  Injectable 0.25 milliGRAM(s) IV Push every 4 hours PRN Moderate Pain (4 - 6)  HYDROmorphone  Injectable 0.5 milliGRAM(s) IV Push every 4 hours PRN Severe Pain (7 - 10)  ondansetron Injectable 4 milliGRAM(s) IV Push every 6 hours PRN Nausea      Vital Signs Last 24 Hrs  T(C): 36.4 (31 May 2025 20:21), Max: 36.9 (31 May 2025 10:51)  T(F): 97.6 (31 May 2025 20:21), Max: 98.4 (31 May 2025 10:51)  HR: 67 (31 May 2025 20:21) (65 - 81)  BP: 150/93 (31 May 2025 20:21) (111/71 - 150/93)  BP(mean): --  RR: 18 (31 May 2025 20:21) (17 - 18)  SpO2: 96% (31 May 2025 20:21) (96% - 98%)    Parameters below as of 31 May 2025 20:21  Patient On (Oxygen Delivery Method): room air        Physical Exam:  General: NAD, resting comfortably in bed  Pulmonary: Nonlabored breathing, no respiratory distress  Cardiovascular: NSR  Abdominal: soft, nondistended, minimal RLQ tenderness to palpation  Extremities: WWP, normal strength  Neuro: A/O x 3  Pulses: palpable distal pulses    I&O's Summary      LABS:                        15.6   6.56  )-----------( 265      ( 30 May 2025 21:50 )             44.8     05-30    138  |  104  |  10  ----------------------------<  118[H]  4.0   |  21[L]  |  0.50    Ca    9.4      30 May 2025 21:50    TPro  7.9  /  Alb  4.5  /  TBili  0.2  /  DBili  x   /  AST  55[H]  /  ALT  54[H]  /  AlkPhos  89  05-30    PT/INR - ( 30 May 2025 21:50 )   PT: 11.7 sec;   INR: 1.00          PTT - ( 30 May 2025 21:50 )  PTT:38.7 sec  Urinalysis Basic - ( 30 May 2025 21:50 )    Color: x / Appearance: x / SG: x / pH: x  Gluc: 118 mg/dL / Ketone: x  / Bili: x / Urobili: x   Blood: x / Protein: x / Nitrite: x   Leuk Esterase: x / RBC: x / WBC x   Sq Epi: x / Non Sq Epi: x / Bacteria: x      CAPILLARY BLOOD GLUCOSE        LIVER FUNCTIONS - ( 30 May 2025 21:50 )  Alb: 4.5 g/dL / Pro: 7.9 g/dL / ALK PHOS: 89 U/L / ALT: 54 U/L / AST: 55 U/L / GGT: x             Will continue to monitor.

## 2025-05-31 NOTE — PROGRESS NOTE ADULT - SUBJECTIVE AND OBJECTIVE BOX
INTERVAL HPI/OVERNIGHT EVENTS: admitted for appendicitis, refusing surgery    SUBJECTIVE:  Patient seen and examined with chief resident on morning rounds.    MEDICATIONS  (STANDING):  atorvastatin 10 milliGRAM(s) Oral at bedtime  famotidine    Tablet 20 milliGRAM(s) Oral every 12 hours  heparin   Injectable 5000 Unit(s) SubCutaneous every 8 hours  lactated ringers. 1000 milliLiter(s) (100 mL/Hr) IV Continuous <Continuous>  piperacillin/tazobactam IVPB.. 3.375 Gram(s) IV Intermittent every 8 hours    MEDICATIONS  (PRN):  acetaminophen     Tablet .. 650 milliGRAM(s) Oral every 6 hours PRN Temp greater or equal to 38C (100.4F), Mild Pain (1 - 3)  HYDROmorphone  Injectable 0.25 milliGRAM(s) IV Push every 4 hours PRN Moderate Pain (4 - 6)  HYDROmorphone  Injectable 0.5 milliGRAM(s) IV Push every 4 hours PRN Severe Pain (7 - 10)  ondansetron Injectable 4 milliGRAM(s) IV Push every 6 hours PRN Nausea      Vital Signs Last 24 Hrs  T(C): 36.6 (31 May 2025 06:24), Max: 36.6 (30 May 2025 20:58)  T(F): 97.8 (31 May 2025 06:24), Max: 97.9 (31 May 2025 01:46)  HR: 65 (31 May 2025 06:24) (65 - 73)  BP: 124/71 (31 May 2025 06:24) (124/71 - 148/83)  BP(mean): --  RR: 18 (31 May 2025 06:24) (17 - 18)  SpO2: 96% (31 May 2025 06:24) (96% - 98%)    Parameters below as of 31 May 2025 06:24  Patient On (Oxygen Delivery Method): room air        Physical exam:  General Appearance: Appears well, NAD  Pulmonary: Nonlabored breathing, no respiratory distress  HEENT: NTAC  Abdomen: Soft, nondistended, nontender  Extremities: WWP, SCD's in place     I&O's Detail      LABS:                        15.6   6.56  )-----------( 265      ( 30 May 2025 21:50 )             44.8     05-30    138  |  104  |  10  ----------------------------<  118[H]  4.0   |  21[L]  |  0.50    Ca    9.4      30 May 2025 21:50    TPro  7.9  /  Alb  4.5  /  TBili  0.2  /  DBili  x   /  AST  55[H]  /  ALT  54[H]  /  AlkPhos  89  05-30    PT/INR - ( 30 May 2025 21:50 )   PT: 11.7 sec;   INR: 1.00          PTT - ( 30 May 2025 21:50 )  PTT:38.7 sec  Urinalysis Basic - ( 30 May 2025 21:50 )    Color: x / Appearance: x / SG: x / pH: x  Gluc: 118 mg/dL / Ketone: x  / Bili: x / Urobili: x   Blood: x / Protein: x / Nitrite: x   Leuk Esterase: x / RBC: x / WBC x   Sq Epi: x / Non Sq Epi: x / Bacteria: x        RADIOLOGY & ADDITIONAL STUDIES: INTERVAL HPI/OVERNIGHT EVENTS: admitted for appendicitis, refusing surgery    SUBJECTIVE:  Patient seen and examined with chief resident on morning rounds. Reports that her pain is well controlled, slightly improved from yesterday. Denies nausea or vomiting and is tolerating clear liquids. Denies fevers or chills.     MEDICATIONS  (STANDING):  atorvastatin 10 milliGRAM(s) Oral at bedtime  famotidine    Tablet 20 milliGRAM(s) Oral every 12 hours  heparin   Injectable 5000 Unit(s) SubCutaneous every 8 hours  lactated ringers. 1000 milliLiter(s) (100 mL/Hr) IV Continuous <Continuous>  piperacillin/tazobactam IVPB.. 3.375 Gram(s) IV Intermittent every 8 hours    MEDICATIONS  (PRN):  acetaminophen     Tablet .. 650 milliGRAM(s) Oral every 6 hours PRN Temp greater or equal to 38C (100.4F), Mild Pain (1 - 3)  HYDROmorphone  Injectable 0.25 milliGRAM(s) IV Push every 4 hours PRN Moderate Pain (4 - 6)  HYDROmorphone  Injectable 0.5 milliGRAM(s) IV Push every 4 hours PRN Severe Pain (7 - 10)  ondansetron Injectable 4 milliGRAM(s) IV Push every 6 hours PRN Nausea      Vital Signs Last 24 Hrs  T(C): 36.6 (31 May 2025 06:24), Max: 36.6 (30 May 2025 20:58)  T(F): 97.8 (31 May 2025 06:24), Max: 97.9 (31 May 2025 01:46)  HR: 65 (31 May 2025 06:24) (65 - 73)  BP: 124/71 (31 May 2025 06:24) (124/71 - 148/83)  BP(mean): --  RR: 18 (31 May 2025 06:24) (17 - 18)  SpO2: 96% (31 May 2025 06:24) (96% - 98%)    Parameters below as of 31 May 2025 06:24  Patient On (Oxygen Delivery Method): room air        Physical exam:  General Appearance: Appears well, NAD  Pulmonary: Nonlabored breathing, no respiratory distress  HEENT: NTAC  Abdomen: Soft, nondistended, minimal RLQ tenderness to palpation  Extremities: WWP, SCD's in place     I&O's Detail      LABS:                        15.6   6.56  )-----------( 265      ( 30 May 2025 21:50 )             44.8     05-30    138  |  104  |  10  ----------------------------<  118[H]  4.0   |  21[L]  |  0.50    Ca    9.4      30 May 2025 21:50    TPro  7.9  /  Alb  4.5  /  TBili  0.2  /  DBili  x   /  AST  55[H]  /  ALT  54[H]  /  AlkPhos  89  05-30    PT/INR - ( 30 May 2025 21:50 )   PT: 11.7 sec;   INR: 1.00          PTT - ( 30 May 2025 21:50 )  PTT:38.7 sec  Urinalysis Basic - ( 30 May 2025 21:50 )    Color: x / Appearance: x / SG: x / pH: x  Gluc: 118 mg/dL / Ketone: x  / Bili: x / Urobili: x   Blood: x / Protein: x / Nitrite: x   Leuk Esterase: x / RBC: x / WBC x   Sq Epi: x / Non Sq Epi: x / Bacteria: x        RADIOLOGY & ADDITIONAL STUDIES:

## 2025-05-31 NOTE — PROGRESS NOTE ADULT - ASSESSMENT
63yo Female pt with PMH of HLD, and no significant PSH. Patient presents to [facility] for continued abdominal pain for 1 month, and seen at Dr. Mccoy's office for chronic appendicitis refusing surgery. Patient s/p cirpo/flagyl for 7 days with no improvement in pain. Patient with VSS, Labs showing H/H 15.8/44.8, AST 55, ALT 54, PTT 38.7; and OSH CT A/P showing chronic appendicitis. Patient sent in by Dr. Mccoy for IV ATBx iso chronic appendicitis and patient refusing surgery.     Plan:    IVF  CLD  IV Zosyn  Pain/nausea PRN  SCD/SQH  AM labs

## 2025-06-01 ENCOUNTER — TRANSCRIPTION ENCOUNTER (OUTPATIENT)
Age: 65
End: 2025-06-01

## 2025-06-01 LAB
ANION GAP SERPL CALC-SCNC: 10 MMOL/L — SIGNIFICANT CHANGE UP (ref 5–17)
BUN SERPL-MCNC: 4 MG/DL — LOW (ref 7–23)
CALCIUM SERPL-MCNC: 9.2 MG/DL — SIGNIFICANT CHANGE UP (ref 8.4–10.5)
CHLORIDE SERPL-SCNC: 110 MMOL/L — HIGH (ref 96–108)
CO2 SERPL-SCNC: 24 MMOL/L — SIGNIFICANT CHANGE UP (ref 22–31)
CREAT SERPL-MCNC: 0.61 MG/DL — SIGNIFICANT CHANGE UP (ref 0.5–1.3)
EGFR: 99 ML/MIN/1.73M2 — SIGNIFICANT CHANGE UP
EGFR: 99 ML/MIN/1.73M2 — SIGNIFICANT CHANGE UP
GLUCOSE SERPL-MCNC: 96 MG/DL — SIGNIFICANT CHANGE UP (ref 70–99)
HCT VFR BLD CALC: 41.6 % — SIGNIFICANT CHANGE UP (ref 34.5–45)
HGB BLD-MCNC: 13.8 G/DL — SIGNIFICANT CHANGE UP (ref 11.5–15.5)
MAGNESIUM SERPL-MCNC: 2.3 MG/DL — SIGNIFICANT CHANGE UP (ref 1.6–2.6)
MCHC RBC-ENTMCNC: 31 PG — SIGNIFICANT CHANGE UP (ref 27–34)
MCHC RBC-ENTMCNC: 33.2 G/DL — SIGNIFICANT CHANGE UP (ref 32–36)
MCV RBC AUTO: 93.5 FL — SIGNIFICANT CHANGE UP (ref 80–100)
NRBC BLD AUTO-RTO: 0 /100 WBCS — SIGNIFICANT CHANGE UP (ref 0–0)
PHOSPHATE SERPL-MCNC: 3.4 MG/DL — SIGNIFICANT CHANGE UP (ref 2.5–4.5)
PLATELET # BLD AUTO: 229 K/UL — SIGNIFICANT CHANGE UP (ref 150–400)
POTASSIUM SERPL-MCNC: 3.9 MMOL/L — SIGNIFICANT CHANGE UP (ref 3.5–5.3)
POTASSIUM SERPL-SCNC: 3.9 MMOL/L — SIGNIFICANT CHANGE UP (ref 3.5–5.3)
RBC # BLD: 4.45 M/UL — SIGNIFICANT CHANGE UP (ref 3.8–5.2)
RBC # FLD: 13.2 % — SIGNIFICANT CHANGE UP (ref 10.3–14.5)
SODIUM SERPL-SCNC: 144 MMOL/L — SIGNIFICANT CHANGE UP (ref 135–145)
WBC # BLD: 4.94 K/UL — SIGNIFICANT CHANGE UP (ref 3.8–10.5)
WBC # FLD AUTO: 4.94 K/UL — SIGNIFICANT CHANGE UP (ref 3.8–10.5)

## 2025-06-01 PROCEDURE — 88304 TISSUE EXAM BY PATHOLOGIST: CPT | Mod: 26

## 2025-06-01 DEVICE — STAPLER COVIDIEN TRI-STAPLE 30MM PURPLE RELOAD: Type: IMPLANTABLE DEVICE | Status: FUNCTIONAL

## 2025-06-01 DEVICE — APPLIER CLIP LIGAMAX 5MM ENDOSCOPIC: Type: IMPLANTABLE DEVICE | Status: FUNCTIONAL

## 2025-06-01 DEVICE — STAPLER COVIDIEN TRI-STAPLE 45MM PURPLE RELOAD: Type: IMPLANTABLE DEVICE | Status: FUNCTIONAL

## 2025-06-01 RX ORDER — HYDROMORPHONE/SOD CHLOR,ISO/PF 2 MG/10 ML
0.5 SYRINGE (ML) INJECTION
Refills: 0 | Status: DISCONTINUED | OUTPATIENT
Start: 2025-06-01 | End: 2025-06-01

## 2025-06-01 RX ORDER — HYDROMORPHONE/SOD CHLOR,ISO/PF 2 MG/10 ML
0.5 SYRINGE (ML) INJECTION
Refills: 0 | Status: DISCONTINUED | OUTPATIENT
Start: 2025-06-01 | End: 2025-06-02

## 2025-06-01 RX ORDER — ACETAMINOPHEN 500 MG/5ML
1000 LIQUID (ML) ORAL EVERY 6 HOURS
Refills: 0 | Status: DISCONTINUED | OUTPATIENT
Start: 2025-06-01 | End: 2025-06-02

## 2025-06-01 RX ORDER — OXYCODONE HYDROCHLORIDE 30 MG/1
5 TABLET ORAL EVERY 6 HOURS
Refills: 0 | Status: DISCONTINUED | OUTPATIENT
Start: 2025-06-01 | End: 2025-06-02

## 2025-06-01 RX ORDER — ONDANSETRON HCL/PF 4 MG/2 ML
4 VIAL (ML) INJECTION EVERY 6 HOURS
Refills: 0 | Status: DISCONTINUED | OUTPATIENT
Start: 2025-06-01 | End: 2025-06-02

## 2025-06-01 RX ORDER — OXYCODONE HYDROCHLORIDE 30 MG/1
10 TABLET ORAL EVERY 6 HOURS
Refills: 0 | Status: DISCONTINUED | OUTPATIENT
Start: 2025-06-01 | End: 2025-06-02

## 2025-06-01 RX ORDER — SODIUM CHLORIDE 9 G/1000ML
1000 INJECTION, SOLUTION INTRAVENOUS
Refills: 0 | Status: DISCONTINUED | OUTPATIENT
Start: 2025-06-01 | End: 2025-06-01

## 2025-06-01 RX ORDER — HEPARIN SODIUM 1000 [USP'U]/ML
5000 INJECTION INTRAVENOUS; SUBCUTANEOUS EVERY 8 HOURS
Refills: 0 | Status: DISCONTINUED | OUTPATIENT
Start: 2025-06-01 | End: 2025-06-02

## 2025-06-01 RX ADMIN — HEPARIN SODIUM 5000 UNIT(S): 1000 INJECTION INTRAVENOUS; SUBCUTANEOUS at 14:26

## 2025-06-01 RX ADMIN — Medication 1000 MILLIGRAM(S): at 22:47

## 2025-06-01 RX ADMIN — Medication 20 MILLIGRAM(S): at 05:16

## 2025-06-01 RX ADMIN — HEPARIN SODIUM 5000 UNIT(S): 1000 INJECTION INTRAVENOUS; SUBCUTANEOUS at 05:15

## 2025-06-01 RX ADMIN — Medication 25 GRAM(S): at 11:54

## 2025-06-01 RX ADMIN — HEPARIN SODIUM 5000 UNIT(S): 1000 INJECTION INTRAVENOUS; SUBCUTANEOUS at 22:48

## 2025-06-01 RX ADMIN — ATORVASTATIN CALCIUM 10 MILLIGRAM(S): 80 TABLET, FILM COATED ORAL at 22:48

## 2025-06-01 RX ADMIN — Medication 25 GRAM(S): at 02:41

## 2025-06-01 NOTE — CHART NOTE - NSCHARTNOTEFT_GEN_A_CORE
Serial Abdominal Exam    SUBJECTIVE: Pt seen and examined at bedside. Reports that her pain is controlled. Occasionally describes a feeling of discomfort in her abdomen rather than pain. Denies nausea or vomiting and is tolerating clear liquids.         Objective   MEDICATIONS  (STANDING):  atorvastatin 10 milliGRAM(s) Oral at bedtime  famotidine    Tablet 20 milliGRAM(s) Oral every 12 hours  heparin   Injectable 5000 Unit(s) SubCutaneous every 8 hours  lactated ringers. 1000 milliLiter(s) (100 mL/Hr) IV Continuous <Continuous>  piperacillin/tazobactam IVPB.. 3.375 Gram(s) IV Intermittent every 8 hours    MEDICATIONS  (PRN):  acetaminophen     Tablet .. 650 milliGRAM(s) Oral every 6 hours PRN Temp greater or equal to 38C (100.4F), Mild Pain (1 - 3)  HYDROmorphone  Injectable 0.25 milliGRAM(s) IV Push every 4 hours PRN Moderate Pain (4 - 6)  HYDROmorphone  Injectable 0.5 milliGRAM(s) IV Push every 4 hours PRN Severe Pain (7 - 10)  ondansetron Injectable 4 milliGRAM(s) IV Push every 6 hours PRN Nausea      Vital Signs Last 24 Hrs  T(C): 36.4 (31 May 2025 20:21), Max: 36.9 (31 May 2025 10:51)  T(F): 97.6 (31 May 2025 20:21), Max: 98.4 (31 May 2025 10:51)  HR: 67 (31 May 2025 20:21) (65 - 81)  BP: 150/93 (31 May 2025 20:21) (111/71 - 150/93)  BP(mean): --  RR: 18 (31 May 2025 20:21) (17 - 18)  SpO2: 96% (31 May 2025 20:21) (96% - 98%)    Parameters below as of 31 May 2025 20:21  Patient On (Oxygen Delivery Method): room air        Physical Exam:  General: NAD, resting comfortably in bed  Pulmonary: Nonlabored breathing, no respiratory distress  Cardiovascular: NSR  Abdominal: soft, nondistended, minimal RLQ ttp  Extremities: WWP, normal strength  Neuro: A/O x 3  Pulses: palpable distal pulses    I&O's Summary      LABS:                        15.6   6.56  )-----------( 265      ( 30 May 2025 21:50 )             44.8     05-30    138  |  104  |  10  ----------------------------<  118[H]  4.0   |  21[L]  |  0.50    Ca    9.4      30 May 2025 21:50    TPro  7.9  /  Alb  4.5  /  TBili  0.2  /  DBili  x   /  AST  55[H]  /  ALT  54[H]  /  AlkPhos  89  05-30    PT/INR - ( 30 May 2025 21:50 )   PT: 11.7 sec;   INR: 1.00          PTT - ( 30 May 2025 21:50 )  PTT:38.7 sec  Urinalysis Basic - ( 30 May 2025 21:50 )    Color: x / Appearance: x / SG: x / pH: x  Gluc: 118 mg/dL / Ketone: x  / Bili: x / Urobili: x   Blood: x / Protein: x / Nitrite: x   Leuk Esterase: x / RBC: x / WBC x   Sq Epi: x / Non Sq Epi: x / Bacteria: x      CAPILLARY BLOOD GLUCOSE        LIVER FUNCTIONS - ( 30 May 2025 21:50 )  Alb: 4.5 g/dL / Pro: 7.9 g/dL / ALK PHOS: 89 U/L / ALT: 54 U/L / AST: 55 U/L / GGT: x             Will continue to monitor.

## 2025-06-01 NOTE — PROGRESS NOTE ADULT - SUBJECTIVE AND OBJECTIVE BOX
Procedure: lap appy with partial cecectomy  Surgeon: Dr. Mccoy    S: Pt seen and examined at bedside. Patient is lying comfortably in bed with no complaints. She states her pain is well controlled with current regimen. She has not had any food or liquids yet. Patient denies fever, nausea, vomiting, chest pain, and shortness of breath. Patient is not yet passing gas or having bowel movements. Patient has not voided yet.     O:  T(C): 36.7 (06-01-25 @ 20:02), Max: 36.7 (06-01-25 @ 20:02)  T(F): 98.1 (06-01-25 @ 20:02), Max: 98.1 (06-01-25 @ 20:02)  HR: 67 (06-01-25 @ 21:02) (58 - 67)  BP: 152/85 (06-01-25 @ 21:02) (130/73 - 152/85)  RR: 13 (06-01-25 @ 21:02) (13 - 17)  SpO2: 100% (06-01-25 @ 21:02) (99% - 100%)  Wt(kg): --                        13.8   4.94  )-----------( 229      ( 01 Jun 2025 06:44 )             41.6     06-01    144  |  110[H]  |  4[L]  ----------------------------<  96  3.9   |  24  |  0.61    Ca    9.2      01 Jun 2025 06:44  Phos  3.4     06-01  Mg     2.3     06-01        Physical Exam  General: Patient is doing well and lying in bed comfortably  Constitutional: alert and oriented   Pulm: Nonlabored breathing, no respiratory distress  CV: Regular rate and rhythm, normal sinus rhythm  Abd:  soft, nontender, nondistended. No rebound, no guarding.             Incisions: clean, dry, intact and healing well, no erythema/induration/edema  Extremities: warm, well perfused, no edema

## 2025-06-01 NOTE — PROGRESS NOTE ADULT - SUBJECTIVE AND OBJECTIVE BOX
SUBJECTIVE: Pt seen and examined at bedside. Reports that her pain is controlled. Occasionally describes a feeling of discomfort in her abdomen rather than pain. Denies nausea or vomiting and is tolerating clear liquids. Passing flatus, and reports BM overnight.         MEDICATIONS  (STANDING):  atorvastatin 10 milliGRAM(s) Oral at bedtime  famotidine    Tablet 20 milliGRAM(s) Oral every 12 hours  heparin   Injectable 5000 Unit(s) SubCutaneous every 8 hours  piperacillin/tazobactam IVPB.. 3.375 Gram(s) IV Intermittent every 8 hours    MEDICATIONS  (PRN):  acetaminophen     Tablet .. 650 milliGRAM(s) Oral every 6 hours PRN Temp greater or equal to 38C (100.4F), Mild Pain (1 - 3)  HYDROmorphone  Injectable 0.25 milliGRAM(s) IV Push every 4 hours PRN Moderate Pain (4 - 6)  HYDROmorphone  Injectable 0.5 milliGRAM(s) IV Push every 4 hours PRN Severe Pain (7 - 10)  ondansetron Injectable 4 milliGRAM(s) IV Push every 6 hours PRN Nausea      Vital Signs Last 24 Hrs  T(C): 36.6 (01 Jun 2025 04:59), Max: 36.9 (31 May 2025 10:51)  T(F): 97.9 (01 Jun 2025 04:59), Max: 98.4 (31 May 2025 10:51)  HR: 73 (01 Jun 2025 04:59) (67 - 81)  BP: 120/79 (01 Jun 2025 04:59) (111/71 - 150/93)  BP(mean): --  RR: 16 (01 Jun 2025 04:59) (16 - 18)  SpO2: 98% (01 Jun 2025 04:59) (96% - 98%)    Parameters below as of 01 Jun 2025 04:59  Patient On (Oxygen Delivery Method): room air        PHYSICAL EXAM  General: NAD, resting comfortably in bed  Pulmonary: Nonlabored breathing, no respiratory distress  Cardiovascular: NSR  Abdominal: soft, nondistended, minimal RLQ ttp  Extremities: WWP, normal strength  Neuro: A/O x 3  Pulses: palpable distal pulses      I&O's Detail    31 May 2025 07:01  -  01 Jun 2025 07:00  --------------------------------------------------------  IN:  Total IN: 0 mL    OUT:    Voided (mL): 700 mL  Total OUT: 700 mL    Total NET: -700 mL          LABS:                        13.8   4.94  )-----------( 229      ( 01 Jun 2025 06:44 )             41.6     06-01    144  |  110[H]  |  4[L]  ----------------------------<  96  3.9   |  24  |  0.61    Ca    9.2      01 Jun 2025 06:44  Phos  3.4     06-01  Mg     2.3     06-01    TPro  7.9  /  Alb  4.5  /  TBili  0.2  /  DBili  x   /  AST  55[H]  /  ALT  54[H]  /  AlkPhos  89  05-30    PT/INR - ( 30 May 2025 21:50 )   PT: 11.7 sec;   INR: 1.00          PTT - ( 30 May 2025 21:50 )  PTT:38.7 sec  Urinalysis Basic - ( 01 Jun 2025 06:44 )    Color: x / Appearance: x / SG: x / pH: x  Gluc: 96 mg/dL / Ketone: x  / Bili: x / Urobili: x   Blood: x / Protein: x / Nitrite: x   Leuk Esterase: x / RBC: x / WBC x   Sq Epi: x / Non Sq Epi: x / Bacteria: x        RADIOLOGY & ADDITIONAL STUDIES:

## 2025-06-01 NOTE — CHART NOTE - NSCHARTNOTESELECT_GEN_ALL_CORE
FLOWER/Event Note
FLOWER/Event Note
Render In Strict Bullet Format?: No
Detail Level: Zone
Initiate Treatment: Aczone gel BID (patient has prescription at home)

## 2025-06-01 NOTE — PROGRESS NOTE ADULT - ASSESSMENT
65yo Female pt with PMH of HLD, and no significant PSH. Patient presents to [facility] for continued abdominal pain for 1 month, and seen at Dr. Mccoy's office for chronic appendicitis refusing surgery. Patient s/p cirpo/flagyl for 7 days with no improvement in pain. Patient with VSS, Labs showing H/H 15.8/44.8, AST 55, ALT 54, PTT 38.7; and OSH CT A/P showing chronic appendicitis. Patient sent in by Dr. Mccoy for IV ATBx iso chronic appendicitis and patient refusing surgery. Now s/p lap appy and partial cecectomy on 6/1.    Regular diet  Pain/nausea PRN  SCD/SQH  Contine home meds

## 2025-06-01 NOTE — BRIEF OPERATIVE NOTE - OPERATION/FINDINGS
Laparoscopic appendectomy, partial cecectomy, lysis of adhesions of right side of colon and extensive lysis of adhesion between TI and appendix, appendix inflamed and stuck to the side wall appendiceal base identified and stapled in 2 loads with partial cecectomy. Appropriate hemostasis at the end.

## 2025-06-01 NOTE — PROGRESS NOTE ADULT - ASSESSMENT
63yo Female pt with PMH of HLD, and no significant PSH. Patient presents to [facility] for continued abdominal pain for 1 month, and seen at Dr. Mccoy's office for chronic appendicitis refusing surgery. Patient s/p cirpo/flagyl for 7 days with no improvement in pain. Patient with VSS, Labs showing H/H 15.8/44.8, AST 55, ALT 54, PTT 38.7; and OSH CT A/P showing chronic appendicitis. Patient sent in by Dr. Mccoy for IV ATBx iso chronic appendicitis and patient refusing surgery.     CLD/IVF  IV Zosyn  Pain/nausea PRN  SCD/SQH  AM labs   63yo Female pt with PMH of HLD, and no significant PSH. Patient presents to [facility] for continued abdominal pain for 1 month, and seen at Dr. Mccoy's office for chronic appendicitis refusing surgery. Patient s/p cirpo/flagyl for 7 days with no improvement in pain. Patient with VSS, Labs showing H/H 15.8/44.8, AST 55, ALT 54, PTT 38.7; and OSH CT A/P showing chronic appendicitis. Patient sent in by Dr. Mccoy for IV ATBx iso chronic appendicitis and patient refusing surgery.     CLD/IVF  IV Zosyn  Pain/nausea PRN  SCD/SQH  AM labs      Addendum 6/1/25: 64y female patient with 1 month of RLQ abdominal pain and CT scan showing appendicitis. She was seen by GI  and treated with 7 days of PO abx. She was seen by me 5/30 with persistent RLQ abdominal pain. We discussed appendectomy however she declined surgery and was agreeable to a trial of IV abx. She was admitted and is having persistent symptoms despite over 24 hrs of IV abx . We reviewed the role of an appendectomy to definitively treat her inflammation and she is now agreeable to proceed. Plan for a laparoscopic appendectomy, possibly robotic assisted.

## 2025-06-02 ENCOUNTER — TRANSCRIPTION ENCOUNTER (OUTPATIENT)
Age: 65
End: 2025-06-02

## 2025-06-02 VITALS
DIASTOLIC BLOOD PRESSURE: 82 MMHG | OXYGEN SATURATION: 95 % | TEMPERATURE: 98 F | HEART RATE: 75 BPM | RESPIRATION RATE: 16 BRPM | SYSTOLIC BLOOD PRESSURE: 135 MMHG

## 2025-06-02 RX ORDER — OXYCODONE HYDROCHLORIDE 30 MG/1
1 TABLET ORAL
Qty: 4 | Refills: 0
Start: 2025-06-02

## 2025-06-02 RX ORDER — ACETAMINOPHEN 500 MG/5ML
2 LIQUID (ML) ORAL
Qty: 0 | Refills: 0 | DISCHARGE
Start: 2025-06-02

## 2025-06-02 RX ORDER — NALOXONE HYDROCHLORIDE 0.4 MG/ML
1 INJECTION, SOLUTION INTRAMUSCULAR; INTRAVENOUS; SUBCUTANEOUS
Qty: 1 | Refills: 0
Start: 2025-06-02

## 2025-06-02 RX ORDER — DOCUSATE SODIUM 100 MG
1 CAPSULE ORAL
Qty: 10 | Refills: 0
Start: 2025-06-02

## 2025-06-02 RX ADMIN — Medication 20 MILLIGRAM(S): at 05:24

## 2025-06-02 RX ADMIN — Medication 1000 MILLIGRAM(S): at 12:54

## 2025-06-02 RX ADMIN — Medication 1000 MILLIGRAM(S): at 05:24

## 2025-06-02 RX ADMIN — HEPARIN SODIUM 5000 UNIT(S): 1000 INJECTION INTRAVENOUS; SUBCUTANEOUS at 05:24

## 2025-06-02 NOTE — DISCHARGE NOTE NURSING/CASE MANAGEMENT/SOCIAL WORK - FINANCIAL ASSISTANCE
[facility] provides services at a reduced cost to those who are determined to be eligible through [facility]’s financial assistance program. Information regarding [facility]’s financial assistance program can be found by going to [email or web address] or by calling [phone].

## 2025-06-02 NOTE — PROGRESS NOTE ADULT - ASSESSMENT
63yo Female pt with PMH of HLD, and no significant PSH. Patient presents to [facility] for continued abdominal pain for 1 month, and seen at Dr. Mccoy's office for chronic appendicitis refusing surgery. Patient s/p cirpo/flagyl for 7 days with no improvement in pain. Patient with VSS, Labs showing H/H 15.8/44.8, AST 55, ALT 54, PTT 38.7; and OSH CT A/P showing chronic appendicitis. Patient sent in by Dr. Mccoy for IV ATBx iso chronic appendicitis and patient refusing surgery. Now s/p lap appy and partial cecectomy on 6/1.    Regular diet  Pain/nausea PRN  SCD/SQH  Contine home meds  Pending DC if tolerating diet

## 2025-06-02 NOTE — DISCHARGE NOTE PROVIDER - HOSPITAL COURSE
63yo Female pt with PMH of HLD, and no significant PSH. Patient presents to [facility] for continued abdominal pain. Patient refers she started having RLQ ~1 month ago, that was intermittent in intensity with no other symptoms. Patient visited Dr. Mccoy's office and got CT A/P on 05/23 showing chronic appendicitis and refused surgery at the time. Patient was sent home on cirpo/flagyl for 7 days with no improvement in pain. Patient referred this to her doctor, and was sent to the ED for admission and IV antibiotics. Patient denies any complaints of fevers, chest pain, SOB, palpitations, nausea, vomiting, changes in BM, urinary symptoms. Patient refers having intermittetn heartburn, that she treats with tums. Admitted for management of chronic appendicitis.     In the ED, pt T36.6C, HR 73, /87, RR17, SpO2 98%. On exam, abdomen soft, mild TTP on RLQ, non distended, NG/NR. Labs significant for WBC 6.56, H/H 15.6/44.8, Plt 265, Na 138, K 4, BUN/Cr 10/0.50, Glu 118, AST 55, ALT 54, ALP 89, Tbili 0.2, PTT 38.7, PT 11.7, INR 1.    PMH: HLD  PSHx: Denies  Medications: Pravastatin 10mg  Allergies: NKDA  Social Hx: Denies  Family Hx: Denies family hx of IBS, Crohn's, UC, or colon cancer.  Last colonoscopy: 2023, WNL  Last EGD:2023, Refers some mild infection    6/1: lap appy and partial cecectomy, 1L fluid, EBL 5cc 63yo Female pt with PMH of HLD, and no significant PSH. Patient presents to [facility] for continued abdominal pain. Patient refers she started having RLQ ~1 month ago, that was intermittent in intensity with no other symptoms. Patient visited Dr. Mccoy's office and got CT A/P on 05/23 showing chronic appendicitis and refused surgery at the time. Patient was sent home on cirpo/flagyl for 7 days with no improvement in pain. Patient referred this to her doctor, and was sent to the ED for admission and IV antibiotics.    In the ED, pt T36.6C, HR 73, /87, RR17, SpO2 98%. On exam, abdomen soft, mild TTP on RLQ, non distended, NG/NR. Labs significant for WBC 6.56, H/H 15.6/44.8, Plt 265, Na 138, K 4, BUN/Cr 10/0.50, Glu 118, AST 55, ALT 54, ALP 89, Tbili 0.2, PTT 38.7, PT 11.7, INR 1. Patient was admitted to the surgery service and on 6/1, underwent lap appendectomy and partial cecectomy. Transferred to PACU in stable condition. No perioperative complications noted. Diet advanced as tolerated. At time of discharge pt is tolerating diet, pain well controlled, pt is ambulating/voiding freely, having adequate bowel function. Pt is HD stable and medically ready for discharge.

## 2025-06-02 NOTE — DISCHARGE NOTE PROVIDER - NSDCCPCAREPLAN_GEN_ALL_CORE_FT
PRINCIPAL DISCHARGE DIAGNOSIS  Diagnosis: Subacute appendicitis  Assessment and Plan of Treatment:

## 2025-06-02 NOTE — DISCHARGE NOTE PROVIDER - NSDCMRMEDTOKEN_GEN_ALL_CORE_FT
pravastatin 10 mg oral tablet: 1 tab(s) orally once a day   acetaminophen 500 mg oral tablet: 2 tab(s) orally every 6 hours  Colace 100 mg oral capsule: 1 cap(s) orally every 12 hours  oxyCODONE 5 mg oral tablet: 1 tab(s) orally every 6 hours MDD: 4  pravastatin 10 mg oral tablet: 1 tab(s) orally once a day   acetaminophen 500 mg oral tablet: 2 tab(s) orally every 6 hours  Colace 100 mg oral capsule: 1 cap(s) orally every 12 hours  Narcan 4 mg/0.1 mL nasal spray: 1 spray(s) intranasally once as needed for respiratory depression respiratory depression  oxyCODONE 5 mg oral tablet: 1 tab(s) orally every 6 hours as needed for pain MDD: 4  pravastatin 10 mg oral tablet: 1 tab(s) orally once a day

## 2025-06-02 NOTE — PROGRESS NOTE ADULT - SUBJECTIVE AND OBJECTIVE BOX
INTERVAL HPI/OVERNIGHT EVENTS: No acute events overnight    STATUS POST:  Lap appendectomy and partial cecectomy    POST OPERATIVE DAY #: 1    SUBJECTIVE: Pt seen and examined at bedside this am by surgery team. She has not had any PO intake since surgery. She has not passed flatus. She has ambulated to the bathroom and voided.    MEDICATIONS  (STANDING):  acetaminophen     Tablet .. 1000 milliGRAM(s) Oral every 6 hours  atorvastatin 10 milliGRAM(s) Oral at bedtime  famotidine    Tablet 20 milliGRAM(s) Oral every 12 hours  heparin   Injectable 5000 Unit(s) SubCutaneous every 8 hours    MEDICATIONS  (PRN):  HYDROmorphone  Injectable 0.5 milliGRAM(s) IV Push every 3 hours PRN Breakthrough pain  ondansetron Injectable 4 milliGRAM(s) IV Push every 6 hours PRN Nausea  oxyCODONE    IR 10 milliGRAM(s) Oral every 6 hours PRN Severe Pain (7 - 10)  oxyCODONE    IR 5 milliGRAM(s) Oral every 6 hours PRN Moderate Pain (4 - 6)      Vital Signs Last 24 Hrs  T(C): 36.5 (02 Jun 2025 04:55), Max: 36.7 (01 Jun 2025 20:02)  T(F): 97.7 (02 Jun 2025 04:55), Max: 98.1 (01 Jun 2025 20:02)  HR: 77 (02 Jun 2025 04:55) (58 - 81)  BP: 109/70 (02 Jun 2025 04:55) (109/70 - 152/85)  BP(mean): 112 (01 Jun 2025 21:02) (96 - 112)  RR: 18 (02 Jun 2025 04:55) (13 - 18)  SpO2: 93% (02 Jun 2025 04:55) (93% - 100%)    Parameters below as of 02 Jun 2025 04:55  Patient On (Oxygen Delivery Method): room air        Physical Exam:  Constitutional: A&Ox3, NAD  Respiratory: normal work of breathing  Cardiovascular: NSR, RRR  Abdomen: soft, non-tender, non distended, no rebound or guarding  Incision: CDI  Legs: WWP, SCDs in place, no calf tenderness        I&O's Detail    01 Jun 2025 07:01  -  02 Jun 2025 07:00  --------------------------------------------------------  IN:  Total IN: 0 mL    OUT:    Voided (mL): 1000 mL  Total OUT: 1000 mL    Total NET: -1000 mL          LABS:                        13.8   4.94  )-----------( 229      ( 01 Jun 2025 06:44 )             41.6     06-01    144  |  110[H]  |  4[L]  ----------------------------<  96  3.9   |  24  |  0.61    Ca    9.2      01 Jun 2025 06:44  Phos  3.4     06-01  Mg     2.3     06-01        Urinalysis Basic - ( 01 Jun 2025 06:44 )    Color: x / Appearance: x / SG: x / pH: x  Gluc: 96 mg/dL / Ketone: x  / Bili: x / Urobili: x   Blood: x / Protein: x / Nitrite: x   Leuk Esterase: x / RBC: x / WBC x   Sq Epi: x / Non Sq Epi: x / Bacteria: x        RADIOLOGY & ADDITIONAL STUDIES:

## 2025-06-02 NOTE — DISCHARGE NOTE NURSING/CASE MANAGEMENT/SOCIAL WORK - PATIENT PORTAL LINK FT
You can access the FollowMyHealth Patient Portal offered by [facility] by registering at the following website: [email or web address]. By joining [employer]’s FollowMyHealth portal, you will also be able to view your health information using other applications (apps) compatible with our system.

## 2025-06-02 NOTE — DISCHARGE NOTE PROVIDER - CARE PROVIDER_API CALL
Nick Mccoy  Surgery  [address] [place] [address], [address]  [place] [address]  Phone: [phone]  Fax: [phone]  Established Patient  Follow Up Time:

## 2025-06-02 NOTE — DISCHARGE NOTE PROVIDER - NSDCCPTREATMENT_GEN_ALL_CORE_FT
PRINCIPAL PROCEDURE  Procedure: Laparoscopic appendectomy  Findings and Treatment:       SECONDARY PROCEDURE  Procedure: Laparoscopic partial cecectomy  Findings and Treatment:

## 2025-06-02 NOTE — DISCHARGE NOTE PROVIDER - NSDCFUADDINST_GEN_ALL_CORE_FT
Please take Tylenol 1000mg every 6 hours for pain.   Please take oxycodone 5mg every 6 hours as needed for pain. Take colace 100mg daily while taking oxycodone to prevent constipation.     General Discharge Instructions:  Please resume all regular home medications unless specifically advised not to take a particular medication. Also, please take any new medications as prescribed.  Please get plenty of rest, continue to ambulate several times per day, and drink adequate amounts of fluids. Avoid lifting weights greater than 5-10 lbs until you follow-up with your surgeon, who will instruct you further regarding activity restrictions.  Avoid driving or operating heavy machinery while taking pain medications.  Please follow-up with your surgeon and Primary Care Provider (PCP) as advised.  Incision Care:  *Please call your doctor or nurse practitioner if you have increased pain, swelling, redness, or drainage from the incision site.  *Avoid swimming and baths until your follow-up appointment.  *You may shower, and wash surgical incisions with a mild soap and warm water. Gently pat the area dry.  *If you have staples, they will be removed at your follow-up appointment.  *If you have steri-strips, they will fall off on their own. Please remove any remaining strips 7-10 days after surgery.    Warning Signs:  Please call your doctor or nurse practitioner if you experience the following:  *You experience new chest pain, pressure, squeezing or tightness.  *New or worsening cough, shortness of breath, or wheeze.  *If you are vomiting and cannot keep down fluids or your medications.  *You are getting dehydrated due to continued vomiting, diarrhea, or other reasons. Signs of dehydration include dry mouth, rapid heartbeat, or feeling dizzy or faint when standing.  *You see blood or dark/black material when you vomit or have a bowel movement.  *You experience burning when you urinate, have blood in your urine, or experience a discharge.  *Your pain is not improving within 8-12 hours or is not gone within 24 hours. Call or return immediately if your pain is getting worse, changes location, or moves to your chest or back.  *You have shaking chills, or fever greater than 101.5 degrees Fahrenheit or 38 degrees Celsius.  *Any change in your symptoms, or any new symptoms that concern you.

## 2025-06-03 PROCEDURE — 80053 COMPREHEN METABOLIC PANEL: CPT

## 2025-06-03 PROCEDURE — C9399: CPT

## 2025-06-03 PROCEDURE — 84100 ASSAY OF PHOSPHORUS: CPT

## 2025-06-03 PROCEDURE — 83690 ASSAY OF LIPASE: CPT

## 2025-06-03 PROCEDURE — 36415 COLL VENOUS BLD VENIPUNCTURE: CPT

## 2025-06-03 PROCEDURE — 85610 PROTHROMBIN TIME: CPT

## 2025-06-03 PROCEDURE — 86900 BLOOD TYPING SEROLOGIC ABO: CPT

## 2025-06-03 PROCEDURE — 80048 BASIC METABOLIC PNL TOTAL CA: CPT

## 2025-06-03 PROCEDURE — 86901 BLOOD TYPING SEROLOGIC RH(D): CPT

## 2025-06-03 PROCEDURE — 96374 THER/PROPH/DIAG INJ IV PUSH: CPT

## 2025-06-03 PROCEDURE — 85027 COMPLETE CBC AUTOMATED: CPT

## 2025-06-03 PROCEDURE — 83735 ASSAY OF MAGNESIUM: CPT

## 2025-06-03 PROCEDURE — 99285 EMERGENCY DEPT VISIT HI MDM: CPT

## 2025-06-03 PROCEDURE — 88304 TISSUE EXAM BY PATHOLOGIST: CPT

## 2025-06-03 PROCEDURE — 86850 RBC ANTIBODY SCREEN: CPT

## 2025-06-03 PROCEDURE — C1889: CPT

## 2025-06-03 PROCEDURE — 85025 COMPLETE CBC W/AUTO DIFF WBC: CPT

## 2025-06-03 PROCEDURE — 85730 THROMBOPLASTIN TIME PARTIAL: CPT

## 2025-06-10 DIAGNOSIS — K36 OTHER APPENDICITIS: ICD-10-CM

## 2025-06-10 DIAGNOSIS — E78.5 HYPERLIPIDEMIA, UNSPECIFIED: ICD-10-CM

## (undated) DEVICE — DRSG DERMABOND 0.7ML

## (undated) DEVICE — ELCTR BOVIE PENCIL BLADE 10FT

## (undated) DEVICE — DRAPE 1/2 SHEET 40X57"

## (undated) DEVICE — STAPLER COVIDIEN ENDO GIA STANDARD HANDLE

## (undated) DEVICE — D HELP - CLEARVIEW CLEARIFY SYSTEM

## (undated) DEVICE — SUT VICRYL PLUS 0 54" TIES

## (undated) DEVICE — TUBING STRYKER PNEUMOCLEAR HIGH FLOW

## (undated) DEVICE — TIP METZENBAUM SCISSOR MONOPOLAR ENDOCUT (ORANGE)

## (undated) DEVICE — WARMING BLANKET UPPER ADULT

## (undated) DEVICE — BLADE SURGICAL #15 CARBON

## (undated) DEVICE — SOL IRR BAG NS 0.9% 3000ML

## (undated) DEVICE — POSITIONER FOAM EGG CRATE ULNAR 2PCS (PINK)

## (undated) DEVICE — VENODYNE/SCD SLEEVE CALF MEDIUM

## (undated) DEVICE — PACK GENERAL LAPAROSCOPY

## (undated) DEVICE — ENDOCATCH 10MM

## (undated) DEVICE — GLV 7.5 PROTEXIS (WHITE)

## (undated) DEVICE — DRAPE 3/4 SHEET 52X76"

## (undated) DEVICE — DRAPE TOP SHEET 53" X 101"

## (undated) DEVICE — CLIPPER BLADE GENERAL USE

## (undated) DEVICE — SUT MONOCRYL 4-0 18" PS-2

## (undated) DEVICE — GLV 7 PROTEXIS (WHITE)

## (undated) DEVICE — LIGASURE MARYLAND 5MM X 37CM

## (undated) DEVICE — Device